# Patient Record
Sex: MALE | Race: ASIAN | NOT HISPANIC OR LATINO | Employment: FULL TIME | URBAN - METROPOLITAN AREA
[De-identification: names, ages, dates, MRNs, and addresses within clinical notes are randomized per-mention and may not be internally consistent; named-entity substitution may affect disease eponyms.]

---

## 2017-01-31 ENCOUNTER — OFFICE VISIT (OUTPATIENT)
Dept: URGENT CARE | Facility: CLINIC | Age: 72
End: 2017-01-31
Payer: COMMERCIAL

## 2017-01-31 VITALS
BODY MASS INDEX: 23.32 KG/M2 | RESPIRATION RATE: 14 BRPM | OXYGEN SATURATION: 98 % | HEIGHT: 65 IN | HEART RATE: 80 BPM | SYSTOLIC BLOOD PRESSURE: 124 MMHG | DIASTOLIC BLOOD PRESSURE: 74 MMHG | WEIGHT: 140 LBS | TEMPERATURE: 99.5 F

## 2017-01-31 DIAGNOSIS — M17.12 PRIMARY OSTEOARTHRITIS OF LEFT KNEE: ICD-10-CM

## 2017-01-31 DIAGNOSIS — M25.562 CHRONIC PAIN OF LEFT KNEE: Primary | ICD-10-CM

## 2017-01-31 DIAGNOSIS — G89.29 CHRONIC PAIN OF LEFT KNEE: Primary | ICD-10-CM

## 2017-01-31 PROCEDURE — 99204 OFFICE O/P NEW MOD 45 MIN: CPT | Performed by: PHYSICIAN ASSISTANT

## 2017-01-31 RX ORDER — TRAMADOL HYDROCHLORIDE 50 MG/1
50 TABLET ORAL EVERY 4 HOURS PRN
Qty: 30 TAB | Refills: 0 | Status: SHIPPED | OUTPATIENT
Start: 2017-01-31 | End: 2017-10-18

## 2017-01-31 NOTE — MR AVS SNAPSHOT
"        Parker Kern   2017 5:00 PM   Office Visit   MRN: [id number]    Department:  [facility]   Dept Phone:  [phone]    Description:  Male : 1945   Provider:  Nathan Zuniga PA-C           Reason for Visit     Knee Pain l knee pain pain x 1 month . was seen at the [facility] , was treated with a steroid injection . says it did not work . still very painful .      Allergies as of 2017     No Known Allergies      You were diagnosed with     Chronic pain of left knee   [169623]  -  Primary     Primary osteoarthritis of left knee   [397883]         Vital Signs     Blood Pressure Pulse Temperature Respirations Height Weight    124/74 mmHg 80 37.5 °C (99.5 °F) 14 1.651 m (5' 5\") 63.504 kg (140 lb)    Body Mass Index Oxygen Saturation Smoking Status             23.30 kg/m2 98% Former Smoker         Basic Information     Date Of Birth Sex Race Ethnicity Preferred Language    1945 Male  Non- English      Your appointments     2017 12:00 PM   Access New To You with JUJU Kapoor   [address]  [facility] [id number] [phone]              Problem List              ICD-10-CM Priority Class Noted - Resolved    Glucose intolerance (impaired glucose tolerance) R73.02   Unknown - Present    HTN (hypertension), benign I10   2010 - Present    BPH (benign prostatic hyperplasia) N40.0   2010 - Present    Gout, arthritis M10.9   2011 - Present    AR (allergic rhinitis) J30.9   2011 - Present    Cataract, right eye H26.9   2014 - Present    Renal insufficiency N28.9   2016 - Present      Health Maintenance        Date Due Completion Dates    IMM DTaP/Tdap/Td Vaccine (1 - Tdap) 1962 ---    IMM ZOSTER VACCINE 2003 ---    COLON CANCER SCREENING ANNUAL FIT 10/15/2014 10/15/2013, 10/23/2012    IMM INFLUENZA (1) 2016 10/1/2015, 10/15/2012, 10/10/2011, 10/1/2010            Current " Immunizations     13-VALENT PCV PREVNAR 1/13/2016    Influenza TIV (IM) 10/1/2015, 10/15/2012, 10/10/2011, 10/1/2010    Pneumococcal polysaccharide vaccine (PPSV-23) 10/16/2012      Below and/or attached are the medications your provider expects you to take. Review all of your home medications and newly ordered medications with your provider and/or pharmacist. Follow medication instructions as directed by your provider and/or pharmacist. Please keep your medication list with you and share with your provider. Update the information when medications are discontinued, doses are changed, or new medications (including over-the-counter products) are added; and carry medication information at all times in the event of emergency situations     Allergies:  No Known Allergies          Medications  Valid as of: January 31, 2017 -  5:41 PM    Generic Name Brand Name Tablet Size Instructions for use    Allopurinol (Tab) ZYLOPRIM 100 MG TAKE ONE TABLET BY MOUTH ONCE A DAY        Aspirin (Tab) aspirin 81 MG Take 81 mg by mouth every day.        Fluticasone Propionate (Suspension) FLONASE 50 MCG/ACT USE ONE SPRAY IN EACH NOSTRIL ONCE A DAY        Losartan Potassium (Tab) COZAAR 50 MG TAKE ONE TABLET BY MOUTH ONCE A DAY        Terazosin HCl (Cap) HYTRIN 5 MG TAKE ONE CAPSULE BY MOUTH ONCE A DAY        TraMADol HCl (Tab) ULTRAM 50 MG Take 1 Tab by mouth every four hours as needed.        Triamcinolone Acetonide (Cream) KENALOG 0.1 % Apply 1 Application to affected area(s) 2 times a day.        .                 Medicines prescribed today were sent to:     [id number] - [place], [place] - [address] [address]    Phone: [phone] Fax: [phone]    Open 24 Hours?: No      Medication refill instructions:       If your prescription bottle indicates you have medication refills left, it is not necessary to call your provider’s office. Please contact your pharmacy and they will refill your  medication.    If your prescription bottle indicates you do not have any refills left, you may request refills at any time through one of the following ways: The online [facility] system (except Urgent Care), by calling your provider’s office, or by asking your pharmacy to contact your provider’s office with a refill request. Medication refills are processed only during regular business hours and may not be available until the next business day. Your provider may request additional information or to have a follow-up visit with you prior to refilling your medication.   *Please Note: Medication refills are assigned a new Rx number when refilled electronically. Your pharmacy may indicate that no refills were authorized even though a new prescription for the same medication is available at the pharmacy. Please request the medicine by name with the pharmacy before contacting your provider for a refill.        Instructions    Degenerative Arthritis  You have osteoarthritis. This is the wear and tear arthritis that comes with aging. It is also called degenerative arthritis. This is common in people past middle age. It is caused by stress on the joints. The large weight bearing joints of the lower extremities are most often affected. The knees, hips, back, neck, and hands can become painful, swollen, and stiff. This is the most common type of arthritis. It comes on with age, carrying too much weight, or from an injury.  Treatment includes resting the sore joint until the pain and swelling improve. Crutches or a walker may be needed for severe flares. Only take over-the-counter or prescription medicines for pain, discomfort, or fever as directed by your caregiver. Local heat therapy may improve motion. Cortisone shots into the joint are sometimes used to reduce pain and swelling during flares.  Osteoarthritis is usually not crippling and progresses slowly. There are things you can do to decrease pain:  · Avoid high impact  activities.   · Exercise regularly.   · Low impact exercises such as walking, biking and swimming help to keep the muscles strong and keep normal joint function.   · Stretching helps to keep your range of motion.   · Lose weight if you are overweight. This reduces joint stress.   In severe cases when you have pain at rest or increasing disability, joint surgery may be helpful. See your caregiver for follow-up treatment as recommended.   SEEK IMMEDIATE MEDICAL CARE IF:   · You have severe joint pain.   · Marked swelling and redness in your joint develops.   · You develop a high fever.   Document Released: 12/18/2006 Document Revised: 03/11/2013 Document Reviewed: 05/19/2008  [employer]® Patient Information ©2013 [employer].          MyChart Status: Patient Declined

## 2017-02-01 NOTE — PROGRESS NOTES
Subjective:      Pt is a 71 y.o. male who presents with Knee Pain            Knee Pain  This is a chronic problem. The current episode started more than 1 year ago. The problem occurs constantly. The problem has been gradually worsening. The symptoms are aggravated by exertion, standing, walking and twisting. He has tried rest, relaxation, NSAIDs and ice ([facility] cortisone injections x 2) for the symptoms. The treatment provided no relief.   Pt states he has chronic left knee DJD and was seen at the [facility] and given a sequence of 2 cortisone shots and pain cream with no relief. 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 Pt has not taken any Rx medications for this condition. Pt states the pain is a 7/10, aching in nature and worse at night. Pt denies CP, SOB, NVD, paresthesias, headaches, dizziness, change in vision, hives, or other joint pain. The pt's medication list, problem list, and allergies have been evaluated and reviewed during today's visit.    PMH:  Past Medical History   Diagnosis Date   • HTN    • BPH    • Glucose intolerance (impaired glucose tolerance)    • COPD    • Chronic allergic rhinitis        PSH:  Past Surgical History   Procedure Laterality Date   • Cataract phaco with iol  9/9/2014     Performed by Nithin Morrow M.D. at [facility]       Fam Hx:    family history includes Genetic in his paternal grandfather; Hypertension in his mother.         Soc HX:  Social History     Social History   • Marital Status:      Spouse Name: N/A   • Number of Children: N/A   • Years of Education: N/A     Occupational History   • Not on file.     Social History Main Topics   • Smoking status: Former Smoker   • Smokeless tobacco: Never Used   • Alcohol Use: No      Comment: rarely   • Drug Use: No   • Sexual Activity: Not on file     Other Topics Concern   • Not on file     Social History Narrative         Medications:    Current outpatient prescriptions:   •   "tramadol (ULTRAM) 50 MG Tab, Take 1 Tab by mouth every four hours as needed., Disp: 30 Tab, Rfl: 0  •  terazosin (HYTRIN) 5 MG Cap, TAKE ONE CAPSULE BY MOUTH ONCE A DAY, Disp: 90 Cap, Rfl: 2  •  losartan (COZAAR) 50 MG Tab, TAKE ONE TABLET BY MOUTH ONCE A DAY, Disp: 30 Tab, Rfl: 11  •  allopurinol (ZYLOPRIM) 100 MG Tab, TAKE ONE TABLET BY MOUTH ONCE A DAY, Disp: 90 Tab, Rfl: 1  •  fluticasone (FLONASE) 50 MCG/ACT nasal spray, USE ONE SPRAY IN EACH NOSTRIL ONCE A DAY, Disp: 16 g, Rfl: 6  •  ASPIRIN 81 MG PO TABS, Take 81 mg by mouth every day., Disp: , Rfl:   •  triamcinolone acetonide (KENALOG) 0.1 % CREA, Apply 1 Application to affected area(s) 2 times a day., Disp: 1 Tube, Rfl: 2      Allergies:  Review of patient's allergies indicates no known allergies.         ROS  Constitutional: Negative for fever, chills and malaise/fatigue.   HENT: Negative for congestion and sore throat.    Eyes: Negative for blurred vision, double vision and photophobia.   Respiratory: Negative for cough and shortness of breath.    Cardiovascular: Negative for chest pain and palpitations.   Gastrointestinal: Negative for heartburn, nausea, vomiting, abdominal pain, diarrhea and constipation.   Genitourinary: Negative for dysuria and flank pain.   Musculoskeletal: POS for left knee joint pain and myalgias.   Skin: Negative for itching and rash.   Neurological: Negative for dizziness, tingling and headaches.   Endo/Heme/Allergies: Does not bruise/bleed easily.   Psychiatric/Behavioral: Negative for depression. The patient is not nervous/anxious.           Objective:     /74 mmHg  Pulse 80  Temp(Src) 37.5 °C (99.5 °F)  Resp 14  Ht 1.651 m (5' 5\")  Wt 63.504 kg (140 lb)  BMI 23.30 kg/m2  SpO2 98%     Physical Exam   Musculoskeletal:        Left knee: He exhibits decreased range of motion, swelling and bony tenderness. He exhibits no effusion, no ecchymosis, no deformity, no laceration, no erythema, normal alignment, no LCL " laxity, normal patellar mobility, normal meniscus and no MCL laxity. Tenderness found. Medial joint line and lateral joint line tenderness noted. No MCL, no LCL and no patellar tendon tenderness noted.        Legs:        Constitutional: PT is oriented to person, place, and time. PT appears well-developed and well-nourished. No distress.   HENT:   Head: Normocephalic and atraumatic.   Mouth/Throat: Oropharynx is clear and moist. No oropharyngeal exudate.   Eyes: Conjunctivae normal and EOM are normal. Pupils are equal, round, and reactive to light.   Neck: Normal range of motion. Neck supple. No thyromegaly present.   Cardiovascular: Normal rate, regular rhythm, normal heart sounds and intact distal pulses.  Exam reveals no gallop and no friction rub.    No murmur heard.  Pulmonary/Chest: Effort normal and breath sounds normal. No respiratory distress. PT has no wheezes. PT has no rales. Pt exhibits no tenderness.   Abdominal: Soft. Bowel sounds are normal. PT exhibits no distension and no mass. There is no tenderness. There is no rebound and no guarding.   Neurological: PT is alert and oriented to person, place, and time. PT has normal reflexes. No cranial nerve deficit.   Skin: Skin is warm and dry. No rash noted. PT is not diaphoretic. No erythema.       Psychiatric: PT has a normal mood and affect. PT behavior is normal. Judgment and thought content normal.          Assessment/Plan:     1. Chronic pain of left knee    - tramadol (ULTRAM) 50 MG Tab; Take 1 Tab by mouth every four hours as needed.  Dispense: 30 Tab; Refill: 0    2. Primary osteoarthritis of left knee    - tramadol (ULTRAM) 50 MG Tab; Take 1 Tab by mouth every four hours as needed.  Dispense: 30 Tab; Refill: 0    [place]  Aware web site evaluation: I have obtained and reviewed patient utilization report from [facility] pharmacy database prior to writing prescription for controlled substance II, III or IV per [place] bill . Based on the  report and my clinical assessment the prescription is medically necessary.   NSAIDs for pain 1-5, Ultram for pain 6-10 or to help get to sleep.  RICE therapy discussed  Gentle ROM exercises discussed  WBAT left LE  Ice/heat therapy discussed  Pt to follow up with his ortho doc for discussion about LEFT TKR  Rest, fluids encouraged.  AVS with medical info given.  Pt was in full understanding and agreement with the plan.  Follow-up as needed if symptoms worsen or fail to improve.

## 2017-02-01 NOTE — PATIENT INSTRUCTIONS
Degenerative Arthritis  You have osteoarthritis. This is the wear and tear arthritis that comes with aging. It is also called degenerative arthritis. This is common in people past middle age. It is caused by stress on the joints. The large weight bearing joints of the lower extremities are most often affected. The knees, hips, back, neck, and hands can become painful, swollen, and stiff. This is the most common type of arthritis. It comes on with age, carrying too much weight, or from an injury.  Treatment includes resting the sore joint until the pain and swelling improve. Crutches or a walker may be needed for severe flares. Only take over-the-counter or prescription medicines for pain, discomfort, or fever as directed by your caregiver. Local heat therapy may improve motion. Cortisone shots into the joint are sometimes used to reduce pain and swelling during flares.  Osteoarthritis is usually not crippling and progresses slowly. There are things you can do to decrease pain:  · Avoid high impact activities.   · Exercise regularly.   · Low impact exercises such as walking, biking and swimming help to keep the muscles strong and keep normal joint function.   · Stretching helps to keep your range of motion.   · Lose weight if you are overweight. This reduces joint stress.   In severe cases when you have pain at rest or increasing disability, joint surgery may be helpful. See your caregiver for follow-up treatment as recommended.   SEEK IMMEDIATE MEDICAL CARE IF:   · You have severe joint pain.   · Marked swelling and redness in your joint develops.   · You develop a high fever.   Document Released: 12/18/2006 Document Revised: 03/11/2013 Document Reviewed: 05/19/2008  [employer]® Patient Information ©2013 [employer].

## 2017-03-09 RX ORDER — ALLOPURINOL 100 MG/1
TABLET ORAL
Qty: 90 TAB | Refills: 3 | Status: SHIPPED | OUTPATIENT
Start: 2017-03-09 | End: 2017-10-18 | Stop reason: SDUPTHER

## 2017-05-31 ENCOUNTER — OFFICE VISIT (OUTPATIENT)
Dept: PULMONOLOGY | Facility: HOSPICE | Age: 72
End: 2017-05-31
Payer: COMMERCIAL

## 2017-05-31 VITALS
OXYGEN SATURATION: 93 % | HEIGHT: 65 IN | WEIGHT: 140 LBS | TEMPERATURE: 97.7 F | RESPIRATION RATE: 16 BRPM | HEART RATE: 101 BPM | DIASTOLIC BLOOD PRESSURE: 74 MMHG | BODY MASS INDEX: 23.32 KG/M2 | SYSTOLIC BLOOD PRESSURE: 122 MMHG

## 2017-05-31 DIAGNOSIS — R05.9 COUGH: ICD-10-CM

## 2017-05-31 DIAGNOSIS — J32.9 CHRONIC SINUSITIS, UNSPECIFIED LOCATION: ICD-10-CM

## 2017-05-31 PROCEDURE — 99244 OFF/OP CNSLTJ NEW/EST MOD 40: CPT | Performed by: INTERNAL MEDICINE

## 2017-05-31 NOTE — MR AVS SNAPSHOT
"Parker Woojonelle   2017 9:00 AM   Office Visit   MRN: [id number]    Department:  [facility]   Dept Phone:  [phone]    Description:  Male : 1945   Provider:  Khanh Sharma M.D.           Reason for Visit     Establish Care     Cough           Allergies as of 2017     No Known Allergies      You were diagnosed with     Cough   [786.2.ICD-9-CM]       Chronic sinusitis, unspecified location   [0157689]         Vital Signs     Blood Pressure Pulse Temperature Respirations Height Weight    122/74 mmHg 101 36.5 °C (97.7 °F) 16 1.651 m (5' 5\") 63.504 kg (140 lb)    Body Mass Index Oxygen Saturation Smoking Status             23.30 kg/m2 93% Former Smoker         Basic Information     Date Of Birth Sex Race Ethnicity Preferred Language    1945 Male  Non- English      Problem List              ICD-10-CM Priority Class Noted - Resolved    Glucose intolerance (impaired glucose tolerance) R73.02   Unknown - Present    HTN (hypertension), benign I10   2010 - Present    BPH (benign prostatic hyperplasia) N40.0   2010 - Present    Gout, arthritis M10.9   2011 - Present    AR (allergic rhinitis) J30.9   2011 - Present    Cataract, right eye H26.9   2014 - Present    Renal insufficiency N28.9   2016 - Present      Health Maintenance        Date Due Completion Dates    IMM DTaP/Tdap/Td Vaccine (1 - Tdap) 1962 ---    IMM ZOSTER VACCINE 2003 ---    COLON CANCER SCREENING ANNUAL FIT 10/15/2014 10/15/2013, 10/23/2012            Current Immunizations     13-VALENT PCV PREVNAR 2016    Influenza TIV (IM) 10/1/2015, 10/15/2012, 10/10/2011, 10/1/2010    Pneumococcal polysaccharide vaccine (PPSV-23) 10/16/2012      Below and/or attached are the medications your provider expects you to take. Review all of your home medications and newly ordered medications with your provider and/or pharmacist. Follow medication instructions as directed by your " provider and/or pharmacist. Please keep your medication list with you and share with your provider. Update the information when medications are discontinued, doses are changed, or new medications (including over-the-counter products) are added; and carry medication information at all times in the event of emergency situations     Allergies:  No Known Allergies          Medications  Valid as of: May 31, 2017 -  9:27 AM    Generic Name Brand Name Tablet Size Instructions for use    Allopurinol (Tab) ZYLOPRIM 100 MG TAKE ONE TABLET BY MOUTH ONCE A DAY        Aspirin (Tab) aspirin 81 MG Take 81 mg by mouth every day.        Fluticasone Propionate (Suspension) FLONASE 50 MCG/ACT USE ONE SPRAY IN EACH NOSTRIL ONCE A DAY        Losartan Potassium (Tab) COZAAR 50 MG TAKE ONE TABLET BY MOUTH ONCE A DAY        Terazosin HCl (Cap) HYTRIN 5 MG TAKE ONE CAPSULE BY MOUTH ONCE A DAY        TraMADol HCl (Tab) ULTRAM 50 MG Take 1 Tab by mouth every four hours as needed.        Triamcinolone Acetonide (Cream) KENALOG 0.1 % Apply 1 Application to affected area(s) 2 times a day.        .                 Medicines prescribed today were sent to:     [facility] - [place], [place] - [address] [place] [address]    Phone: [phone] Fax: [phone]    Open 24 Hours?: No      Medication refill instructions:       If your prescription bottle indicates you have medication refills left, it is not necessary to call your provider’s office. Please contact your pharmacy and they will refill your medication.    If your prescription bottle indicates you do not have any refills left, you may request refills at any time through one of the following ways: The online [employer] system (except Urgent Care), by calling your provider’s office, or by asking your pharmacy to contact your provider’s office with a refill request. Medication refills are processed only during regular business hours and may not be available until  the next business day. Your provider may request additional information or to have a follow-up visit with you prior to refilling your medication.   *Please Note: Medication refills are assigned a new Rx number when refilled electronically. Your pharmacy may indicate that no refills were authorized even though a new prescription for the same medication is available at the pharmacy. Please request the medicine by name with the pharmacy before contacting your provider for a refill.        Your To Do List     Future Labs/Procedures Complete By Expires    AFB CULTURE  As directed 5/31/2018    AMB PULMONARY FUNCTION TEST/LAB  As directed 5/31/2018    Comments:    Please do pre and post, diffusion, lung volumes    CT-CHEST (THORAX) W/O  As directed 5/31/2018    CT-MAXILLOFACIAL W/O PLUS RECONS  As directed 5/31/2018    CULTURE RESPIRATORY W/ GRM STN  As directed 5/31/2018      Referral     A referral request has been sent to our patient care coordination department. Please allow 3-5 business days for us to process this request and contact you either by phone or mail. If you do not hear from us by the 5th business day, please call us at [phone].        Instructions    1. We have scheduled a CT scan of the sinuses  2. We have scheduled a CT scan of the chest  3. We have requested sputum cultures to include AFB culture  4. We have referral to ENT for her sinusitis  5. We have ordered pulmonary function test  6. Recommend continuing with Flonase and Mucinex. Also recommend continuing with saline nasal rinses  7. Recommended follow-up after the above tests have been completed          DesignPax Access Code: [email or web address]  Expires: 6/15/2017 10:04 AM    DesignPax  A secure, online tool to manage your health information     [employer] DesignPax® is a secure, online tool that connects you to your personalized health information from the privacy of your home -- day or night - making it very easy for you to manage your  healthcare. Once the activation process is completed, you can even access your medical information using the 360Guanxi hardy, which is available for free in the Apple Hardy store or Google Play store.     360Guanxi provides the following levels of access (as shown below):   My Chart Features   [facility] Primary [facility] Doctor [facility]  Specialists [facility]  Urgent  Care Non-[facility]  Primary Care  Doctor   Email your healthcare team securely and privately 24/7 X X X    Manage appointments: schedule your next appointment; view details of past/upcoming appointments X      Request prescription refills. X      View recent personal medical records, including lab and immunizations X X X X   View health record, including health history, allergies, medications X X X X   Read reports about your outpatient visits, procedures, consult and ER notes X X X X   See your discharge summary, which is a recap of your hospital and/or ER visit that includes your diagnosis, lab results, and care plan. X X       How to register for 360Guanxi:  1. Go to  [email or web address].  2. Click on the Sign Up Now box, which takes you to the New Member Sign Up page. You will need to provide the following information:  a. Enter your 360Guanxi Access Code exactly as it appears at the top of this page. (You will not need to use this code after you’ve completed the sign-up process. If you do not sign up before the expiration date, you must request a new code.)   b. Enter your date of birth.   c. Enter your home email address.   d. Click Submit, and follow the next screen’s instructions.  3. Create a 360Guanxi ID. This will be your 360Guanxi login ID and cannot be changed, so think of one that is secure and easy to remember.  4. Create a 360Guanxi password. You can change your password at any time.  5. Enter your Password Reset Question and Answer. This can be used at a later time if you forget your password.   6. Enter your e-mail address. This allows you to receive e-mail  notifications when new information is available in [employer].  7. Click Sign Up. You can now view your health information.    For assistance activating your [employer] account, call [phone]

## 2017-05-31 NOTE — PROGRESS NOTES
Parker Kern is a 71 y.o. male here for chronic cough.  Patient was referred by Dr. Jose Maher.    History of Present Illness:    The patient is a 71-year-old with a history of gout and hypertension. He is on losartan. He is having a chronic cough for over a year however I do notice that he was seen in this office in 2008 for cough at that time. He does have a history of sinus disease. He takes Flonase and Mucinex. He used to do saline nasal rinses but he stopped doing this. He takes Afrin periodically. He quit smoking 20 years ago. He has no history of asthma or COPD. He does have a history of some type of TB exposure. He is from the [facility]. He says that he received INH in the [facility] for about 3 months. He also mentions something about a lymph node biopsy but his history is unclear. 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 He denies any fevers or chills or night sweats. He has had some mild weight loss. His last chest x-ray was in January 2016 and it was clear. He's not had prior pulmonary function testing. He denies any chest pains or pleurisy. He denies any dyspnea on exertion. He has had no wheezing.    Constitutional:  Negative for fever, chills, sweats, and fatigue.  Eyes:  Negative for eye pain and visual changes.  HENT:  Negative for tinnitus and hoarse voice.  Cardiovascular:  Negative for chest pain, leg swelling, syncope and orthopnea.  Respiratory:  See HPI for pertinent negatives  Sleep:  Negative for somnolence, loud snoring, sleep disturbance due to breathing, insomnia.  Gastrointestinal:  Negative for dysphagia, nausea and abdominal pain.  Heme/lymph:  Denies easy bruising, blood clots.  Musculoskeletal:  Negative for arthralgias, sore muscles and back pain.  Skin:  Negative for rash and color change.  Neurological:  Negative for headaches, lightheadedness and weakness.  Psychiatric:  Denies depression.    Current Outpatient Prescriptions   Medication Sig Dispense Refill  "  • fluticasone (FLONASE) 50 MCG/ACT nasal spray USE ONE SPRAY IN EACH NOSTRIL ONCE A DAY 16 g 2   • allopurinol (ZYLOPRIM) 100 MG Tab TAKE ONE TABLET BY MOUTH ONCE A DAY 90 Tab 3   • tramadol (ULTRAM) 50 MG Tab Take 1 Tab by mouth every four hours as needed. 30 Tab 0   • terazosin (HYTRIN) 5 MG Cap TAKE ONE CAPSULE BY MOUTH ONCE A DAY 90 Cap 2   • losartan (COZAAR) 50 MG Tab TAKE ONE TABLET BY MOUTH ONCE A DAY 30 Tab 11   • triamcinolone acetonide (KENALOG) 0.1 % CREA Apply 1 Application to affected area(s) 2 times a day. 1 Tube 2   • ASPIRIN 81 MG PO TABS Take 81 mg by mouth every day.       No current facility-administered medications for this visit.       Social History   Substance Use Topics   • Smoking status: Former Smoker -- 0.25 packs/day for 3 years     Types: Cigarettes     Quit date: 01/01/1995   • Smokeless tobacco: Never Used   • Alcohol Use: No      Comment: rarely       Past Medical History   Diagnosis Date   • HTN    • BPH    • Glucose intolerance (impaired glucose tolerance)    • COPD    • Chronic allergic rhinitis    • Allergic rhinitis        Past Surgical History   Procedure Laterality Date   • Cataract phaco with iol  9/9/2014     Performed by Nithin Morrow M.D. at [facility]       Allergies:  Review of patient's allergies indicates no known allergies.    Family History   Problem Relation Age of Onset   • Genetic Paternal Grandfather    • Hypertension Mother    • No Known Problems Father        Physical Examination    Filed Vitals:    05/31/17 0847   Height: 1.651 m (5' 5\")   Weight: 63.504 kg (140 lb)   Weight % change since last entry.: 0 %   BP: 122/74   Pulse: 101   BMI (Calculated): 23.3   Resp: 16   Temp: 36.5 °C (97.7 °F)   O2 sat % room air: 93 %       Physical Exam:  Constitutional:  Well developed and well nourished.  Head:  Normocephalic and atraumatic.  Nose:  Nose normal.  Mouth/Throat:  Oropharynx is clear and moist, no lesions.    Eyes:  Conjunctivae and EOM are " normal.  Pupils are equal, round, and reactive to light.  Neck:  Normal range of motion.  Supple.  No JVD. No tracheal deviation.  No thyromegally  Cardiovascular:  Normal rate, regular rhythm, normal heart sounds and intact distal pulses.  Pulmonary/Chest:  No accessory muscle use.  No wheezing, rales or rhonchi.  No dullness to percussion, tenderness or deformity.  Abdominal:  Soft.  No ascites.  No Hepatosplenomegally.  Non tender.  Musculoskeletal.  Normal range of motion.  No muscular atrophy.  Lymphadenopathy:  No cervical or supraclavicular adenopathy  Neurological:  Alert and oriented.  Cranial nerves intact.  No focal deficits  Skin:  No rashes or ulcers.  Psyciatric:  Normal mood and affect.      Assessment and Plan:  1. Cough  I suspect the cough is related to postnasal drip and sinusitis. Must consider the possibility of COPD and asthma. Must also consider the possibility of TB. We will check pulmonary function testing as well as a CT scan of the chest. I have also ordered AFB sputum culture as well as a regular sputum culture.  - AMB PULMONARY FUNCTION TEST/LAB; Future  - AFB CULTURE; Future  - CULTURE RESPIRATORY W/ [facility] STN; Future  - CT-CHEST (THORAX) W/O; Future    2. Chronic sinusitis, unspecified location  I ordered a CT scan of the sinuses and I made a referral over to ENT. I would like him to continue with the Flonase, Mucinex and saline nasal rinses.  - CT-MAXILLOFACIAL W/O PLUS RECONS; Future  - REFERRAL TO ENT          Followup Return in about 6 weeks (around 7/12/2017) for follow up visit with Khanh Sharma MD.

## 2017-05-31 NOTE — PATIENT INSTRUCTIONS
1. We have scheduled a CT scan of the sinuses  2. We have scheduled a CT scan of the chest  3. We have requested sputum cultures to include AFB culture  4. We have referral to ENT for her sinusitis  5. We have ordered pulmonary function test  6. Recommend continuing with Flonase and Mucinex. Also recommend continuing with saline nasal rinses  7. Recommended follow-up after the above tests have been completed

## 2017-08-16 RX ORDER — TERAZOSIN 5 MG/1
CAPSULE ORAL
Qty: 30 CAP | Refills: 0 | Status: SHIPPED | OUTPATIENT
Start: 2017-08-16 | End: 2017-08-17 | Stop reason: SDUPTHER

## 2017-08-17 RX ORDER — TERAZOSIN 5 MG/1
CAPSULE ORAL
Qty: 30 CAP | Refills: 0 | Status: SHIPPED | OUTPATIENT
Start: 2017-08-17 | End: 2017-09-18 | Stop reason: SDUPTHER

## 2017-08-31 RX ORDER — FLUTICASONE PROPIONATE 50 MCG
SPRAY, SUSPENSION (ML) NASAL
Qty: 1 BOTTLE | Refills: 0 | Status: SHIPPED | OUTPATIENT
Start: 2017-08-31 | End: 2017-09-02 | Stop reason: SDUPTHER

## 2017-08-31 NOTE — TELEPHONE ENCOUNTER
Was the patient seen in the last year in this department?  No    Does patient have an active prescription for medications requested? No     Received Request Via: Pharmacy     Patient has not been seen since 5/2016? Please advise

## 2017-09-18 RX ORDER — TERAZOSIN 5 MG/1
CAPSULE ORAL
Qty: 30 CAP | Refills: 0 | Status: SHIPPED | OUTPATIENT
Start: 2017-09-18 | End: 2017-10-18 | Stop reason: SDUPTHER

## 2017-10-18 ENCOUNTER — OFFICE VISIT (OUTPATIENT)
Dept: MEDICAL GROUP | Facility: MEDICAL CENTER | Age: 72
End: 2017-10-18
Payer: COMMERCIAL

## 2017-10-18 VITALS
HEART RATE: 78 BPM | DIASTOLIC BLOOD PRESSURE: 60 MMHG | OXYGEN SATURATION: 95 % | WEIGHT: 131 LBS | BODY MASS INDEX: 21.8 KG/M2 | TEMPERATURE: 99 F | SYSTOLIC BLOOD PRESSURE: 118 MMHG | RESPIRATION RATE: 16 BRPM

## 2017-10-18 DIAGNOSIS — R73.02 GLUCOSE INTOLERANCE (IMPAIRED GLUCOSE TOLERANCE): ICD-10-CM

## 2017-10-18 DIAGNOSIS — Z23 INFLUENZA VACCINE NEEDED: ICD-10-CM

## 2017-10-18 DIAGNOSIS — N40.1 BENIGN PROSTATIC HYPERPLASIA WITH URINARY RETENTION: ICD-10-CM

## 2017-10-18 DIAGNOSIS — R33.8 BENIGN PROSTATIC HYPERPLASIA WITH URINARY RETENTION: ICD-10-CM

## 2017-10-18 DIAGNOSIS — Z12.11 SCREEN FOR COLON CANCER: ICD-10-CM

## 2017-10-18 DIAGNOSIS — I10 HTN (HYPERTENSION), BENIGN: ICD-10-CM

## 2017-10-18 DIAGNOSIS — M10.9 GOUT, ARTHRITIS: ICD-10-CM

## 2017-10-18 PROCEDURE — 90662 IIV NO PRSV INCREASED AG IM: CPT | Performed by: INTERNAL MEDICINE

## 2017-10-18 PROCEDURE — 99214 OFFICE O/P EST MOD 30 MIN: CPT | Mod: 25 | Performed by: INTERNAL MEDICINE

## 2017-10-18 PROCEDURE — 90471 IMMUNIZATION ADMIN: CPT | Performed by: INTERNAL MEDICINE

## 2017-10-18 RX ORDER — TERAZOSIN 5 MG/1
5 CAPSULE ORAL DAILY
Qty: 30 CAP | Refills: 11 | Status: SHIPPED | OUTPATIENT
Start: 2017-10-18 | End: 2017-10-18

## 2017-10-18 RX ORDER — ALLOPURINOL 100 MG/1
100 TABLET ORAL DAILY
Qty: 90 TAB | Refills: 3 | Status: SHIPPED | OUTPATIENT
Start: 2017-10-18 | End: 2018-11-02 | Stop reason: SDUPTHER

## 2017-10-18 RX ORDER — TERAZOSIN 5 MG/1
5 CAPSULE ORAL DAILY
Qty: 90 CAP | Refills: 3 | Status: SHIPPED | OUTPATIENT
Start: 2017-10-18 | End: 2018-11-05 | Stop reason: SDUPTHER

## 2017-10-18 RX ORDER — LOSARTAN POTASSIUM 50 MG/1
50 TABLET ORAL DAILY
Qty: 90 TAB | Refills: 3 | Status: SHIPPED | OUTPATIENT
Start: 2017-10-18 | End: 2017-12-27 | Stop reason: SDUPTHER

## 2017-10-18 ASSESSMENT — PATIENT HEALTH QUESTIONNAIRE - PHQ9: CLINICAL INTERPRETATION OF PHQ2 SCORE: 0

## 2017-10-18 NOTE — PROGRESS NOTES
CC: Multiple issues    HPI:   Parker presents today with the following.    1. Glucose intolerance (impaired glucose tolerance)  Not been seen in one year he does check sugars occasionally at home reporting good results. No excessive thirst or urination.    2. Benign prostatic hyperplasia with urinary retention  Maintain on Vogel and reports slightly weakened stream but no severe symptoms at night.    3. HTN (hypertension), benign  Blood pressure well controlled denying any chest pain or shortness of breath no edema.    4. Gout, arthritis  No recent gout flares maintain on medications.    5. Screen for colon cancer  Due for stool test.    6. Influenza vaccine needed        Patient Active Problem List    Diagnosis Date Noted   • Renal insufficiency 06/09/2016   • Cataract, right eye 09/09/2014   • AR (allergic rhinitis) 09/13/2011   • Gout, arthritis 01/26/2011   • Benign prostatic hyperplasia with urinary retention 09/22/2010   • HTN (hypertension), benign 06/09/2010   • Glucose intolerance (impaired glucose tolerance)        Current Outpatient Prescriptions   Medication Sig Dispense Refill   • terazosin (HYTRIN) 5 MG Cap Take 1 Cap by mouth every day. 90 Cap 3   • allopurinol (ZYLOPRIM) 100 MG Tab Take 1 Tab by mouth every day. 90 Tab 3   • losartan (COZAAR) 50 MG Tab Take 1 Tab by mouth every day. 90 Tab 3   • fluticasone (FLONASE) 50 MCG/ACT nasal spray USE ONE SPRAY IN EACH NOSTRIL ONCE A DAY 16 g 1   • ASPIRIN 81 MG PO TABS Take 81 mg by mouth every day.       No current facility-administered medications for this visit.          Allergies as of 10/18/2017   • (No Known Allergies)        ROS: As per HPI.    /60   Pulse 78   Temp 37.2 °C (99 °F)   Resp 16   Wt 59.4 kg (131 lb)   SpO2 95%   BMI 21.80 kg/m²     Physical Exam:  Gen:         Alert and oriented, No apparent distress.  Neck:        No Lymphadenopathy or Bruits.  Lungs:     Clear to auscultation bilaterally  CV:          Regular rate and  rhythm. No murmurs, rubs or gallops.               Ext:          No clubbing, cyanosis, edema.      Assessment and Plan.   72 y.o. male with the following issues.    1. Glucose intolerance (impaired glucose tolerance)  Discussed diet exercise rechecking labs.  - COMP METABOLIC PANEL; Future  - LIPID PROFILE; Future  - HEMOGLOBIN A1C; Future    2. Benign prostatic hyperplasia with urinary retention  Continue current medications.    3. HTN (hypertension), benign  Currently well controlled, Discuss diet, exercise and salt restriction.    4. Gout, arthritis  Continue allopurinol    5. Screen for colon cancer    - OCCULT BLOOD FECES IMMUNOASSAY; Future    6. Influenza vaccine needed    - INFLUENZA VACCINE, HIGH DOSE (65+ ONLY)

## 2017-10-19 ENCOUNTER — HOSPITAL ENCOUNTER (OUTPATIENT)
Dept: LAB | Facility: MEDICAL CENTER | Age: 72
End: 2017-10-19
Attending: INTERNAL MEDICINE
Payer: COMMERCIAL

## 2017-10-19 DIAGNOSIS — R73.02 GLUCOSE INTOLERANCE (IMPAIRED GLUCOSE TOLERANCE): ICD-10-CM

## 2017-10-19 LAB
ALBUMIN SERPL BCP-MCNC: 4 G/DL (ref 3.2–4.9)
ALBUMIN/GLOB SERPL: 1.4 G/DL
ALP SERPL-CCNC: 62 U/L (ref 30–99)
ALT SERPL-CCNC: 13 U/L (ref 2–50)
ANION GAP SERPL CALC-SCNC: 7 MMOL/L (ref 0–11.9)
AST SERPL-CCNC: 17 U/L (ref 12–45)
BILIRUB SERPL-MCNC: 0.7 MG/DL (ref 0.1–1.5)
BUN SERPL-MCNC: 19 MG/DL (ref 8–22)
CALCIUM SERPL-MCNC: 9.4 MG/DL (ref 8.5–10.5)
CHLORIDE SERPL-SCNC: 109 MMOL/L (ref 96–112)
CHOLEST SERPL-MCNC: 163 MG/DL (ref 100–199)
CO2 SERPL-SCNC: 24 MMOL/L (ref 20–33)
CREAT SERPL-MCNC: 1.3 MG/DL (ref 0.5–1.4)
EST. AVERAGE GLUCOSE BLD GHB EST-MCNC: 117 MG/DL
GFR SERPL CREATININE-BSD FRML MDRD: 54 ML/MIN/1.73 M 2
GLOBULIN SER CALC-MCNC: 2.8 G/DL (ref 1.9–3.5)
GLUCOSE SERPL-MCNC: 115 MG/DL (ref 65–99)
HBA1C MFR BLD: 5.7 % (ref 0–5.6)
HDLC SERPL-MCNC: 56 MG/DL
LDLC SERPL CALC-MCNC: 92 MG/DL
POTASSIUM SERPL-SCNC: 4.1 MMOL/L (ref 3.6–5.5)
PROT SERPL-MCNC: 6.8 G/DL (ref 6–8.2)
SODIUM SERPL-SCNC: 140 MMOL/L (ref 135–145)
TRIGL SERPL-MCNC: 73 MG/DL (ref 0–149)

## 2017-10-19 PROCEDURE — 36415 COLL VENOUS BLD VENIPUNCTURE: CPT

## 2017-10-19 PROCEDURE — 83036 HEMOGLOBIN GLYCOSYLATED A1C: CPT

## 2017-10-19 PROCEDURE — 80053 COMPREHEN METABOLIC PANEL: CPT

## 2017-10-19 PROCEDURE — 80061 LIPID PANEL: CPT

## 2017-10-20 ENCOUNTER — TELEPHONE (OUTPATIENT)
Dept: MEDICAL GROUP | Facility: MEDICAL CENTER | Age: 72
End: 2017-10-20

## 2017-10-20 ENCOUNTER — HOSPITAL ENCOUNTER (OUTPATIENT)
Facility: MEDICAL CENTER | Age: 72
End: 2017-10-20
Attending: INTERNAL MEDICINE
Payer: COMMERCIAL

## 2017-10-20 PROCEDURE — 82274 ASSAY TEST FOR BLOOD FECAL: CPT

## 2017-10-20 NOTE — TELEPHONE ENCOUNTER
----- Message from Jose Maher M.D. sent at 10/19/2017  6:55 PM PDT -----  Labs look ok recheck in 6 months.

## 2017-10-22 DIAGNOSIS — Z12.11 SCREEN FOR COLON CANCER: ICD-10-CM

## 2017-10-22 LAB — HEMOCCULT STL QL IA: NEGATIVE

## 2017-10-27 RX ORDER — FLUTICASONE PROPIONATE 50 MCG
SPRAY, SUSPENSION (ML) NASAL
Qty: 16 G | Refills: 0 | Status: SHIPPED | OUTPATIENT
Start: 2017-10-27 | End: 2017-11-28 | Stop reason: SDUPTHER

## 2017-11-28 RX ORDER — FLUTICASONE PROPIONATE 50 MCG
SPRAY, SUSPENSION (ML) NASAL
Qty: 16 G | Refills: 0 | Status: SHIPPED | OUTPATIENT
Start: 2017-11-28 | End: 2018-04-18 | Stop reason: SDUPTHER

## 2018-04-18 RX ORDER — FLUTICASONE PROPIONATE 50 MCG
1 SPRAY, SUSPENSION (ML) NASAL DAILY
Qty: 16 G | Refills: 2 | Status: SHIPPED | OUTPATIENT
Start: 2018-04-18 | End: 2018-08-30 | Stop reason: SDUPTHER

## 2018-08-30 RX ORDER — FLUTICASONE PROPIONATE 50 MCG
SPRAY, SUSPENSION (ML) NASAL
Qty: 16 G | Refills: 2 | Status: SHIPPED | OUTPATIENT
Start: 2018-08-30 | End: 2019-01-09 | Stop reason: SDUPTHER

## 2018-11-04 RX ORDER — ALLOPURINOL 100 MG/1
TABLET ORAL
Qty: 90 TAB | Refills: 2 | Status: SHIPPED | OUTPATIENT
Start: 2018-11-04 | End: 2019-11-20 | Stop reason: SDUPTHER

## 2018-11-06 RX ORDER — TERAZOSIN 5 MG/1
CAPSULE ORAL
Qty: 90 CAP | Refills: 2 | Status: SHIPPED | OUTPATIENT
Start: 2018-11-06 | End: 2019-08-08 | Stop reason: SDUPTHER

## 2019-01-09 ENCOUNTER — OFFICE VISIT (OUTPATIENT)
Dept: MEDICAL GROUP | Facility: MEDICAL CENTER | Age: 74
End: 2019-01-09
Payer: COMMERCIAL

## 2019-01-09 VITALS
OXYGEN SATURATION: 96 % | WEIGHT: 134 LBS | DIASTOLIC BLOOD PRESSURE: 82 MMHG | HEART RATE: 87 BPM | BODY MASS INDEX: 22.33 KG/M2 | HEIGHT: 65 IN | SYSTOLIC BLOOD PRESSURE: 136 MMHG | TEMPERATURE: 98.3 F

## 2019-01-09 DIAGNOSIS — R73.02 IGT (IMPAIRED GLUCOSE TOLERANCE): ICD-10-CM

## 2019-01-09 DIAGNOSIS — R33.8 BENIGN PROSTATIC HYPERPLASIA WITH URINARY RETENTION: ICD-10-CM

## 2019-01-09 DIAGNOSIS — I10 HTN (HYPERTENSION), BENIGN: ICD-10-CM

## 2019-01-09 DIAGNOSIS — M10.9 GOUT, ARTHRITIS: ICD-10-CM

## 2019-01-09 DIAGNOSIS — N40.1 BENIGN PROSTATIC HYPERPLASIA WITH URINARY RETENTION: ICD-10-CM

## 2019-01-09 DIAGNOSIS — Z23 NEED FOR VACCINATION: ICD-10-CM

## 2019-01-09 PROCEDURE — 90471 IMMUNIZATION ADMIN: CPT | Performed by: INTERNAL MEDICINE

## 2019-01-09 PROCEDURE — 99214 OFFICE O/P EST MOD 30 MIN: CPT | Mod: 25 | Performed by: INTERNAL MEDICINE

## 2019-01-09 PROCEDURE — 90662 IIV NO PRSV INCREASED AG IM: CPT | Performed by: INTERNAL MEDICINE

## 2019-01-09 RX ORDER — BENZONATATE 100 MG/1
100 CAPSULE ORAL 3 TIMES DAILY PRN
Qty: 30 CAP | Refills: 0 | Status: SHIPPED | OUTPATIENT
Start: 2019-01-09 | End: 2019-06-13

## 2019-01-09 RX ORDER — FLUTICASONE PROPIONATE 50 MCG
1 SPRAY, SUSPENSION (ML) NASAL DAILY
Qty: 16 G | Refills: 2 | Status: SHIPPED | OUTPATIENT
Start: 2019-01-09 | End: 2020-01-31 | Stop reason: SDUPTHER

## 2019-01-09 NOTE — PROGRESS NOTES
"CC: Follow-up blood sugars, hypertension, prostate.    HPI:   Parker presents today with the following.    1. IGT (impaired glucose tolerance)  He is overdue for recheck of blood work labs have been ordered but he is not yet completed.  Never truly been diabetic.    2. HTN (hypertension), benign  Blood pressures on upper end of normal he does report compliance to 2 drug therapy.    3. Benign prostatic hyperplasia with urinary retention  Does report getting up twice at night normal stream increased frequency during the day.  No pain no other associated symptoms    4. Gout, arthritis  Intermittent joint pains maintain on allopurinol coming due for recheck of labs.    5. Need for vaccination        Patient Active Problem List    Diagnosis Date Noted   • IGT (impaired glucose tolerance) 01/09/2019   • Renal insufficiency 06/09/2016   • Cataract, right eye 09/09/2014   • AR (allergic rhinitis) 09/13/2011   • Gout, arthritis 01/26/2011   • Benign prostatic hyperplasia with urinary retention 09/22/2010   • HTN (hypertension), benign 06/09/2010       Current Outpatient Prescriptions   Medication Sig Dispense Refill   • benzonatate (TESSALON) 100 MG Cap Take 1 Cap by mouth 3 times a day as needed for Cough. 30 Cap 0   • fluticasone (FLONASE) 50 MCG/ACT nasal spray Spray 1 Spray in nose every day. IN EACH NOSTRIL 16 g 2   • terazosin (HYTRIN) 5 MG Cap TAKE ONE CAPSULE BY MOUTH ONCE A DAY 90 Cap 2   • allopurinol (ZYLOPRIM) 100 MG Tab TAKE ONE TABLET BY MOUTH ONCE A DAY 90 Tab 2   • losartan (COZAAR) 50 MG Tab TAKE ONE TABLET BY MOUTH ONCE A DAY 30 Tab 10   • ASPIRIN 81 MG PO TABS Take 81 mg by mouth every day.       No current facility-administered medications for this visit.          Allergies as of 01/09/2019   • (No Known Allergies)        ROS: Denies Chest pain, SOB, LE edema.    /82 (BP Location: Right arm, Patient Position: Sitting)   Pulse 87   Temp 36.8 °C (98.3 °F)   Ht 1.651 m (5' 5\")   Wt 60.8 kg (134 " lb)   SpO2 96%   BMI 22.30 kg/m²     Physical Exam:  Gen:         Alert and oriented, No apparent distress.  Neck:        No Lymphadenopathy or Bruits.  Lungs:     Clear to auscultation bilaterally  CV:          Regular rate and rhythm. No murmurs, rubs or gallops.               Ext:          No clubbing, cyanosis, edema.      Assessment and Plan.   73 y.o. male with the following issues.    1. IGT (impaired glucose tolerance)  Recheck blood work  - COMP METABOLIC PANEL; Future  - Lipid Profile; Future  - HEMOGLOBIN A1C; Future    2. HTN (hypertension), benign  Continue to monitor    3. Benign prostatic hyperplasia with urinary retention  Continue Vogel and discussed lifestyle changes    4. Gout, arthritis  Continue allopurinol checking levels    5. Need for vaccination    - INFLUENZA VACCINE, HIGH DOSE (65+ ONLY)

## 2019-01-10 ENCOUNTER — HOSPITAL ENCOUNTER (OUTPATIENT)
Dept: LAB | Facility: MEDICAL CENTER | Age: 74
End: 2019-01-10
Attending: INTERNAL MEDICINE
Payer: COMMERCIAL

## 2019-01-10 DIAGNOSIS — R73.02 IGT (IMPAIRED GLUCOSE TOLERANCE): ICD-10-CM

## 2019-01-10 LAB
ALBUMIN SERPL BCP-MCNC: 4.1 G/DL (ref 3.2–4.9)
ALBUMIN/GLOB SERPL: 1.4 G/DL
ALP SERPL-CCNC: 63 U/L (ref 30–99)
ALT SERPL-CCNC: 11 U/L (ref 2–50)
ANION GAP SERPL CALC-SCNC: 6 MMOL/L (ref 0–11.9)
AST SERPL-CCNC: 18 U/L (ref 12–45)
BILIRUB SERPL-MCNC: 0.8 MG/DL (ref 0.1–1.5)
BUN SERPL-MCNC: 19 MG/DL (ref 8–22)
CALCIUM SERPL-MCNC: 9.1 MG/DL (ref 8.5–10.5)
CHLORIDE SERPL-SCNC: 107 MMOL/L (ref 96–112)
CHOLEST SERPL-MCNC: 168 MG/DL (ref 100–199)
CO2 SERPL-SCNC: 24 MMOL/L (ref 20–33)
CREAT SERPL-MCNC: 1.33 MG/DL (ref 0.5–1.4)
EST. AVERAGE GLUCOSE BLD GHB EST-MCNC: 120 MG/DL
FASTING STATUS PATIENT QL REPORTED: NORMAL
GLOBULIN SER CALC-MCNC: 2.9 G/DL (ref 1.9–3.5)
GLUCOSE SERPL-MCNC: 106 MG/DL (ref 65–99)
HBA1C MFR BLD: 5.8 % (ref 0–5.6)
HDLC SERPL-MCNC: 59 MG/DL
LDLC SERPL CALC-MCNC: 96 MG/DL
POTASSIUM SERPL-SCNC: 4.1 MMOL/L (ref 3.6–5.5)
PROT SERPL-MCNC: 7 G/DL (ref 6–8.2)
SODIUM SERPL-SCNC: 137 MMOL/L (ref 135–145)
TRIGL SERPL-MCNC: 65 MG/DL (ref 0–149)

## 2019-01-10 PROCEDURE — 80053 COMPREHEN METABOLIC PANEL: CPT

## 2019-01-10 PROCEDURE — 83036 HEMOGLOBIN GLYCOSYLATED A1C: CPT

## 2019-01-10 PROCEDURE — 36415 COLL VENOUS BLD VENIPUNCTURE: CPT

## 2019-01-10 PROCEDURE — 80061 LIPID PANEL: CPT

## 2019-02-07 RX ORDER — LOSARTAN POTASSIUM 50 MG/1
TABLET ORAL
Qty: 90 TAB | Refills: 2 | Status: SHIPPED | OUTPATIENT
Start: 2019-02-07 | End: 2020-01-22 | Stop reason: SDUPTHER

## 2019-06-13 ENCOUNTER — OFFICE VISIT (OUTPATIENT)
Dept: MEDICAL GROUP | Facility: MEDICAL CENTER | Age: 74
End: 2019-06-13
Payer: COMMERCIAL

## 2019-06-13 VITALS
WEIGHT: 130.29 LBS | HEIGHT: 65 IN | DIASTOLIC BLOOD PRESSURE: 62 MMHG | SYSTOLIC BLOOD PRESSURE: 100 MMHG | OXYGEN SATURATION: 94 % | BODY MASS INDEX: 21.71 KG/M2 | HEART RATE: 84 BPM | TEMPERATURE: 97.2 F

## 2019-06-13 DIAGNOSIS — R05.9 COUGH: ICD-10-CM

## 2019-06-13 PROCEDURE — 99213 OFFICE O/P EST LOW 20 MIN: CPT | Performed by: INTERNAL MEDICINE

## 2019-06-13 RX ORDER — AZITHROMYCIN 250 MG/1
250 TABLET, FILM COATED ORAL DAILY
Qty: 6 TAB | Refills: 0 | Status: SHIPPED | OUTPATIENT
Start: 2019-06-13 | End: 2019-06-18

## 2019-06-13 ASSESSMENT — PATIENT HEALTH QUESTIONNAIRE - PHQ9: CLINICAL INTERPRETATION OF PHQ2 SCORE: 0

## 2019-06-13 NOTE — PROGRESS NOTES
"      CC: Cough                                                                                                                                      HPI:   Parker presents today with the following.    1. Cough  Presents complaining of 1 month of cough productive of yellowish sputum worsening over the last week.  Reports a sore throat ear fullness no true fevers.  No other localizing infectious symptoms      Patient Active Problem List    Diagnosis Date Noted   • IGT (impaired glucose tolerance) 01/09/2019   • Renal insufficiency 06/09/2016   • Cataract, right eye 09/09/2014   • AR (allergic rhinitis) 09/13/2011   • Gout, arthritis 01/26/2011   • Benign prostatic hyperplasia with urinary retention 09/22/2010   • HTN (hypertension), benign 06/09/2010       Current Outpatient Prescriptions   Medication Sig Dispense Refill   • azithromycin (ZITHROMAX Z-CHRISTINE) 250 MG Tab Take 1 Tab by mouth every day for 5 days. Take 2 tabs on day 1 6 Tab 0   • losartan (COZAAR) 50 MG Tab TAKE ONE TABLET BY MOUTH ONCE A DAY 90 Tab 2   • fluticasone (FLONASE) 50 MCG/ACT nasal spray Spray 1 Spray in nose every day. IN EACH NOSTRIL 16 g 2   • terazosin (HYTRIN) 5 MG Cap TAKE ONE CAPSULE BY MOUTH ONCE A DAY 90 Cap 2   • allopurinol (ZYLOPRIM) 100 MG Tab TAKE ONE TABLET BY MOUTH ONCE A DAY 90 Tab 2   • ASPIRIN 81 MG PO TABS Take 81 mg by mouth every day.       No current facility-administered medications for this visit.          Allergies as of 06/13/2019   • (No Known Allergies)        ROS: Denies Chest pain, SOB, LE edema.    /62 (BP Location: Left arm, Patient Position: Sitting, BP Cuff Size: Adult)   Pulse 84   Temp 36.2 °C (97.2 °F) (Temporal)   Ht 1.651 m (5' 5\")   Wt 59.1 kg (130 lb 4.7 oz)   SpO2 94%   BMI 21.68 kg/m²     Physical Exam:  Gen:         Alert and oriented, No apparent distress.  Heent:       TMs clear bilaterally, oropharynx without erythema or exudates   neck:        No Lymphadenopathy or Bruits.  Lungs:     " Clear to auscultation bilaterally  CV:          Regular rate and rhythm. No murmurs, rubs or gallops.               Ext:          No clubbing, cyanosis, edema.      Assessment and Plan.   74 y.o. male with the following issues.    1. Cough  Likely viral in etiology. Have recommended over-the-counter pain control and symptom relief. Patient will followup if spiking fevers or symptoms worsen.  Given the duration of symptoms if they do not improve with over-the-counter symptom relief have sent an antibiotic to the pharmacy to have on hand.

## 2019-08-08 RX ORDER — TERAZOSIN 5 MG/1
CAPSULE ORAL
Qty: 90 CAP | Refills: 3 | Status: SHIPPED
Start: 2019-08-08 | End: 2020-07-25

## 2019-08-09 RX ORDER — FLUTICASONE PROPIONATE 50 MCG
SPRAY, SUSPENSION (ML) NASAL
Qty: 16 G | Refills: 1 | Status: SHIPPED | OUTPATIENT
Start: 2019-08-09 | End: 2019-12-20

## 2019-11-20 RX ORDER — ALLOPURINOL 100 MG/1
TABLET ORAL
Qty: 90 TAB | Refills: 2 | Status: SHIPPED
Start: 2019-11-20 | End: 2020-07-25

## 2019-12-20 RX ORDER — FLUTICASONE PROPIONATE 50 MCG
SPRAY, SUSPENSION (ML) NASAL
Qty: 16 G | Refills: 0 | Status: SHIPPED | OUTPATIENT
Start: 2019-12-20

## 2020-01-22 ENCOUNTER — OFFICE VISIT (OUTPATIENT)
Dept: MEDICAL GROUP | Facility: MEDICAL CENTER | Age: 75
End: 2020-01-22
Payer: COMMERCIAL

## 2020-01-22 VITALS
HEIGHT: 65 IN | OXYGEN SATURATION: 96 % | TEMPERATURE: 97.2 F | BODY MASS INDEX: 20.49 KG/M2 | SYSTOLIC BLOOD PRESSURE: 128 MMHG | DIASTOLIC BLOOD PRESSURE: 64 MMHG | HEART RATE: 94 BPM | WEIGHT: 123 LBS

## 2020-01-22 DIAGNOSIS — R05.9 COUGH: ICD-10-CM

## 2020-01-22 DIAGNOSIS — Z23 NEED FOR VACCINATION: ICD-10-CM

## 2020-01-22 DIAGNOSIS — I10 HTN (HYPERTENSION), BENIGN: ICD-10-CM

## 2020-01-22 DIAGNOSIS — Z13.6 SCREENING FOR CARDIOVASCULAR CONDITION: ICD-10-CM

## 2020-01-22 DIAGNOSIS — R73.02 IGT (IMPAIRED GLUCOSE TOLERANCE): ICD-10-CM

## 2020-01-22 PROCEDURE — 90662 IIV NO PRSV INCREASED AG IM: CPT | Performed by: INTERNAL MEDICINE

## 2020-01-22 PROCEDURE — 99214 OFFICE O/P EST MOD 30 MIN: CPT | Mod: 25 | Performed by: INTERNAL MEDICINE

## 2020-01-22 PROCEDURE — 90471 IMMUNIZATION ADMIN: CPT | Performed by: INTERNAL MEDICINE

## 2020-01-22 RX ORDER — AZITHROMYCIN 250 MG/1
250 TABLET, FILM COATED ORAL DAILY
Qty: 6 TAB | Refills: 0 | Status: SHIPPED | OUTPATIENT
Start: 2020-01-22 | End: 2020-01-27

## 2020-01-22 RX ORDER — LOSARTAN POTASSIUM 50 MG/1
50 TABLET ORAL DAILY
Qty: 90 TAB | Refills: 2 | Status: SHIPPED
Start: 2020-01-22 | End: 2020-07-25

## 2020-01-22 NOTE — PROGRESS NOTES
CC: Cough, blood sugars, hypertension.                                                                                                                                      HPI:   Parker presents today with the following.    1. Cough  Presents reporting 3 weeks of upper respiratory symptoms cough productive of greenish-yellow sputum.  Reports it is not improving in nature over the past week.  No earache mild sore throat no fevers no other localizing infectious symptoms.    2. IGT (impaired glucose tolerance)  History of elevated blood sugars he is on a 6-month schedule but has not had labs for the last year.    3. HTN (hypertension), benign  Maintain on blood pressure medication reports compliance well-controlled today.    Patient Active Problem List    Diagnosis Date Noted   • IGT (impaired glucose tolerance) 01/09/2019   • Renal insufficiency 06/09/2016   • Cataract, right eye 09/09/2014   • AR (allergic rhinitis) 09/13/2011   • Gout, arthritis 01/26/2011   • Benign prostatic hyperplasia with urinary retention 09/22/2010   • HTN (hypertension), benign 06/09/2010       Current Outpatient Medications   Medication Sig Dispense Refill   • azithromycin (ZITHROMAX Z-CHRISTINE) 250 MG Tab Take 1 Tab by mouth every day for 5 days. Take 2 tabs on day 1 6 Tab 0   • losartan (COZAAR) 50 MG Tab Take 1 Tab by mouth every day. 90 Tab 2   • fluticasone (FLONASE) 50 MCG/ACT nasal spray USE ONE SPRAY IN EACH NOSTRIL ONCE A DAY 16 g 0   • allopurinol (ZYLOPRIM) 100 MG Tab TAKE ONE TABLET BY MOUTH ONCE A DAY 90 Tab 2   • terazosin (HYTRIN) 5 MG Cap TAKE ONE CAPSULE BY MOUTH ONCE A DAY 90 Cap 3   • fluticasone (FLONASE) 50 MCG/ACT nasal spray Spray 1 Spray in nose every day. IN EACH NOSTRIL 16 g 2   • ASPIRIN 81 MG PO TABS Take 81 mg by mouth every day.       No current facility-administered medications for this visit.          Allergies as of 01/22/2020   • (No Known Allergies)        ROS: Denies Chest pain, SOB, LE edema.    BP  "128/64 (BP Location: Right arm, Patient Position: Sitting)   Pulse 94   Temp 36.2 °C (97.2 °F)   Ht 1.651 m (5' 5\")   Wt 55.8 kg (123 lb)   SpO2 96%   BMI 20.47 kg/m²     Physical Exam:  Gen:         Alert and oriented, No apparent distress.  Neck:        No Lymphadenopathy or Bruits.  Lungs:     Clear to auscultation bilaterally  CV:          Regular rate and rhythm. No murmurs, rubs or gallops.               Ext:          No clubbing, cyanosis, edema.      Assessment and Plan.   74 y.o. male with the following issues.    1. Cough  Given duration of symptoms have given a Z-Felix over-the-counter symptom relief recommended.    2. IGT (impaired glucose tolerance)  Have given a lab slip.  - HEMOGLOBIN A1C; Future    3. HTN (hypertension), benign  Currently well controlled, Discuss diet, exercise and salt restriction.  No change to medication therapy.    4. Screening for cardiovascular condition    - Comp Metabolic Panel; Future  - Lipid Profile; Future    5. Need for vaccination    - INFLUENZA VACCINE, HIGH DOSE (65+ ONLY)      "

## 2020-01-31 RX ORDER — FLUTICASONE PROPIONATE 50 MCG
1 SPRAY, SUSPENSION (ML) NASAL DAILY
Qty: 16 G | Refills: 11 | Status: SHIPPED
Start: 2020-01-31 | End: 2020-07-25

## 2020-07-15 ENCOUNTER — APPOINTMENT (OUTPATIENT)
Dept: RADIOLOGY | Facility: MEDICAL CENTER | Age: 75
End: 2020-07-15
Attending: EMERGENCY MEDICINE
Payer: COMMERCIAL

## 2020-07-15 ENCOUNTER — HOSPITAL ENCOUNTER (EMERGENCY)
Facility: MEDICAL CENTER | Age: 75
End: 2020-07-15
Attending: EMERGENCY MEDICINE
Payer: COMMERCIAL

## 2020-07-15 VITALS
HEART RATE: 86 BPM | TEMPERATURE: 99.6 F | OXYGEN SATURATION: 98 % | HEIGHT: 66 IN | SYSTOLIC BLOOD PRESSURE: 109 MMHG | RESPIRATION RATE: 19 BRPM | DIASTOLIC BLOOD PRESSURE: 61 MMHG | WEIGHT: 125 LBS | BODY MASS INDEX: 20.09 KG/M2

## 2020-07-15 DIAGNOSIS — J20.9 ACUTE BRONCHITIS, UNSPECIFIED ORGANISM: ICD-10-CM

## 2020-07-15 DIAGNOSIS — Z20.822 SUSPECTED COVID-19 VIRUS INFECTION: ICD-10-CM

## 2020-07-15 LAB
ALBUMIN SERPL BCP-MCNC: 3.5 G/DL (ref 3.2–4.9)
ALBUMIN/GLOB SERPL: 1.7 G/DL
ALP SERPL-CCNC: 46 U/L (ref 30–99)
ALT SERPL-CCNC: 13 U/L (ref 2–50)
ANION GAP SERPL CALC-SCNC: 14 MMOL/L (ref 7–16)
AST SERPL-CCNC: 17 U/L (ref 12–45)
BASOPHILS # BLD AUTO: 0 % (ref 0–1.8)
BASOPHILS # BLD: 0 K/UL (ref 0–0.12)
BILIRUB SERPL-MCNC: 0.3 MG/DL (ref 0.1–1.5)
BUN SERPL-MCNC: 36 MG/DL (ref 8–22)
CALCIUM SERPL-MCNC: 7.9 MG/DL (ref 8.5–10.5)
CHLORIDE SERPL-SCNC: 108 MMOL/L (ref 96–112)
CO2 SERPL-SCNC: 17 MMOL/L (ref 20–33)
COVID ORDER STATUS COVID19: NORMAL
CREAT SERPL-MCNC: 1.8 MG/DL (ref 0.5–1.4)
CRP SERPL HS-MCNC: 2.24 MG/DL (ref 0–0.75)
D DIMER PPP IA.FEU-MCNC: 0.45 UG/ML (FEU) (ref 0–0.5)
EOSINOPHIL # BLD AUTO: 0 K/UL (ref 0–0.51)
EOSINOPHIL NFR BLD: 0 % (ref 0–6.9)
ERYTHROCYTE [DISTWIDTH] IN BLOOD BY AUTOMATED COUNT: 53.9 FL (ref 35.9–50)
ERYTHROCYTE [SEDIMENTATION RATE] IN BLOOD BY WESTERGREN METHOD: 18 MM/HOUR (ref 0–20)
GLOBULIN SER CALC-MCNC: 2.1 G/DL (ref 1.9–3.5)
GLUCOSE SERPL-MCNC: 136 MG/DL (ref 65–99)
HCT VFR BLD AUTO: 21.7 % (ref 42–52)
HGB BLD-MCNC: 6.9 G/DL (ref 14–18)
IMM GRANULOCYTES # BLD AUTO: 0.03 K/UL (ref 0–0.11)
IMM GRANULOCYTES NFR BLD AUTO: 0.8 % (ref 0–0.9)
LACTATE BLD-SCNC: 1.7 MMOL/L (ref 0.5–2)
LDH SERPL L TO P-CCNC: 179 U/L (ref 107–266)
LYMPHOCYTES # BLD AUTO: 0.38 K/UL (ref 1–4.8)
LYMPHOCYTES NFR BLD: 9.6 % (ref 22–41)
MCH RBC QN AUTO: 31.2 PG (ref 27–33)
MCHC RBC AUTO-ENTMCNC: 31.8 G/DL (ref 33.7–35.3)
MCV RBC AUTO: 98.2 FL (ref 81.4–97.8)
MONOCYTES # BLD AUTO: 0.32 K/UL (ref 0–0.85)
MONOCYTES NFR BLD AUTO: 8.1 % (ref 0–13.4)
NEUTROPHILS # BLD AUTO: 3.23 K/UL (ref 1.82–7.42)
NEUTROPHILS NFR BLD: 81.5 % (ref 44–72)
NRBC # BLD AUTO: 0 K/UL
NRBC BLD-RTO: 0 /100 WBC
PLATELET # BLD AUTO: 126 K/UL (ref 164–446)
PMV BLD AUTO: 9.2 FL (ref 9–12.9)
POTASSIUM SERPL-SCNC: 4 MMOL/L (ref 3.6–5.5)
PROT SERPL-MCNC: 5.6 G/DL (ref 6–8.2)
RBC # BLD AUTO: 2.21 M/UL (ref 4.7–6.1)
SODIUM SERPL-SCNC: 139 MMOL/L (ref 135–145)
WBC # BLD AUTO: 4 K/UL (ref 4.8–10.8)

## 2020-07-15 PROCEDURE — 83605 ASSAY OF LACTIC ACID: CPT

## 2020-07-15 PROCEDURE — 99284 EMERGENCY DEPT VISIT MOD MDM: CPT

## 2020-07-15 PROCEDURE — U0003 INFECTIOUS AGENT DETECTION BY NUCLEIC ACID (DNA OR RNA); SEVERE ACUTE RESPIRATORY SYNDROME CORONAVIRUS 2 (SARS-COV-2) (CORONAVIRUS DISEASE [COVID-19]), AMPLIFIED PROBE TECHNIQUE, MAKING USE OF HIGH THROUGHPUT TECHNOLOGIES AS DESCRIBED BY CMS-2020-01-R: HCPCS

## 2020-07-15 PROCEDURE — 700102 HCHG RX REV CODE 250 W/ 637 OVERRIDE(OP): Performed by: EMERGENCY MEDICINE

## 2020-07-15 PROCEDURE — 86140 C-REACTIVE PROTEIN: CPT

## 2020-07-15 PROCEDURE — 83615 LACTATE (LD) (LDH) ENZYME: CPT

## 2020-07-15 PROCEDURE — 96367 TX/PROPH/DG ADDL SEQ IV INF: CPT

## 2020-07-15 PROCEDURE — 96365 THER/PROPH/DIAG IV INF INIT: CPT

## 2020-07-15 PROCEDURE — 85025 COMPLETE CBC W/AUTO DIFF WBC: CPT

## 2020-07-15 PROCEDURE — 700111 HCHG RX REV CODE 636 W/ 250 OVERRIDE (IP): Performed by: EMERGENCY MEDICINE

## 2020-07-15 PROCEDURE — 87040 BLOOD CULTURE FOR BACTERIA: CPT | Mod: 91

## 2020-07-15 PROCEDURE — A9270 NON-COVERED ITEM OR SERVICE: HCPCS | Performed by: EMERGENCY MEDICINE

## 2020-07-15 PROCEDURE — 85652 RBC SED RATE AUTOMATED: CPT

## 2020-07-15 PROCEDURE — 700105 HCHG RX REV CODE 258: Performed by: EMERGENCY MEDICINE

## 2020-07-15 PROCEDURE — C9803 HOPD COVID-19 SPEC COLLECT: HCPCS | Performed by: EMERGENCY MEDICINE

## 2020-07-15 PROCEDURE — 71045 X-RAY EXAM CHEST 1 VIEW: CPT

## 2020-07-15 PROCEDURE — 80053 COMPREHEN METABOLIC PANEL: CPT

## 2020-07-15 PROCEDURE — 85379 FIBRIN DEGRADATION QUANT: CPT

## 2020-07-15 RX ORDER — ACETAMINOPHEN 325 MG/1
650 TABLET ORAL ONCE
Status: COMPLETED | OUTPATIENT
Start: 2020-07-15 | End: 2020-07-15

## 2020-07-15 RX ORDER — AZITHROMYCIN 500 MG/1
500 INJECTION, POWDER, LYOPHILIZED, FOR SOLUTION INTRAVENOUS ONCE
Status: COMPLETED | OUTPATIENT
Start: 2020-07-15 | End: 2020-07-15

## 2020-07-15 RX ORDER — SODIUM CHLORIDE 9 MG/ML
1000 INJECTION, SOLUTION INTRAVENOUS ONCE
Status: COMPLETED | OUTPATIENT
Start: 2020-07-15 | End: 2020-07-15

## 2020-07-15 RX ORDER — AZITHROMYCIN 250 MG/1
250 TABLET, FILM COATED ORAL DAILY
Qty: 6 TAB | Refills: 0 | Status: SHIPPED | OUTPATIENT
Start: 2020-07-15 | End: 2020-07-20

## 2020-07-15 RX ADMIN — AZITHROMYCIN MONOHYDRATE 500 MG: 500 INJECTION, POWDER, LYOPHILIZED, FOR SOLUTION INTRAVENOUS at 15:50

## 2020-07-15 RX ADMIN — SODIUM CHLORIDE 1000 ML: 9 INJECTION, SOLUTION INTRAVENOUS at 13:39

## 2020-07-15 RX ADMIN — CEFTRIAXONE SODIUM 2 G: 2 INJECTION, POWDER, FOR SOLUTION INTRAMUSCULAR; INTRAVENOUS at 15:12

## 2020-07-15 RX ADMIN — ACETAMINOPHEN 650 MG: 325 TABLET, FILM COATED ORAL at 13:39

## 2020-07-15 ASSESSMENT — FIBROSIS 4 INDEX: FIB4 SCORE: 2.88

## 2020-07-15 NOTE — ED PROVIDER NOTES
ED Provider Note    CHIEF COMPLAINT  Chief Complaint   Patient presents with   • Shortness of Breath   • Dizziness       HPI  Parker Kern is a 75 y.o. male who presents for evaluation of fever shortness of breath and dizziness.  The patient has a history of hypertension as well as mild COPD.  He works at the [employer].  He has been exposed to significant numbers of crowds.  He is noted to be febrile in triage and immediately brought back.  He reports fevers chills dizziness.  No strokelike symptoms such as numbness weakness tingling to the arms legs or face.  No associated headache or neck stiffness.  No known exposure to COVID-19 positive patients with the patient works in a busy environment    REVIEW OF SYSTEMS  See HPI for further details.  Positive for fevers cough congestion all other systems are negative.     PAST MEDICAL HISTORY  Past Medical History:   Diagnosis Date   • Allergic rhinitis    • BPH    • Chronic allergic rhinitis    • COPD    • Glucose intolerance (impaired glucose tolerance)    • HTN        FAMILY HISTORY  Noncontributory    SOCIAL HISTORY  Social History     Socioeconomic History   • Marital status:      Spouse name: Not on file   • Number of children: Not on file   • Years of education: Not on file   • Highest education level: Not on file   Occupational History   • Not on file   Social Needs   • Financial resource strain: Not on file   • Food insecurity     Worry: Not on file     Inability: Not on file   • Transportation needs     Medical: Not on file     Non-medical: Not on file   Tobacco Use   • Smoking status: Former Smoker     Packs/day: 0.10     Years: 36.00     Pack years: 3.60     Types: Cigarettes     Last attempt to quit: 1995     Years since quittin.5   • Smokeless tobacco: Never Used   • Tobacco comment: started at 14/ 1 cig per day when he did smoke   Substance and Sexual Activity   • Alcohol use: Yes     Alcohol/week: 0.6 oz     Types: 1  "Cans of beer per week     Comment: rarely   • Drug use: No   • Sexual activity: Never     Partners: Female   Lifestyle   • Physical activity     Days per week: Not on file     Minutes per session: Not on file   • Stress: Not on file   Relationships   • Social connections     Talks on phone: Not on file     Gets together: Not on file     Attends [employer] service: Not on file     Active member of club or organization: Not on file     Attends meetings of clubs or organizations: Not on file     Relationship status: Not on file   • Intimate partner violence     Fear of current or ex partner: Not on file     Emotionally abused: Not on file     Physically abused: Not on file     Forced sexual activity: Not on file   Other Topics Concern   • Not on file   Social History Narrative   • Not on file       SURGICAL HISTORY  Past Surgical History:   Procedure Laterality Date   • CATARACT PHACO WITH IOL  9/9/2014    Performed by Nithin Morrow M.D. at [facility]       CURRENT MEDICATIONS  No current facility-administered medications for this encounter.     Current Outpatient Medications:   •  fluticasone (FLONASE) 50 MCG/ACT nasal spray, Spray 1 Spray in nose every day. IN EACH NOSTRIL, Disp: 16 g, Rfl: 11  •  losartan (COZAAR) 50 MG Tab, Take 1 Tab by mouth every day., Disp: 90 Tab, Rfl: 2  •  fluticasone (FLONASE) 50 MCG/ACT nasal spray, USE ONE SPRAY IN EACH NOSTRIL ONCE A DAY, Disp: 16 g, Rfl: 0  •  allopurinol (ZYLOPRIM) 100 MG Tab, TAKE ONE TABLET BY MOUTH ONCE A DAY, Disp: 90 Tab, Rfl: 2  •  terazosin (HYTRIN) 5 MG Cap, TAKE ONE CAPSULE BY MOUTH ONCE A DAY, Disp: 90 Cap, Rfl: 3  •  ASPIRIN 81 MG PO TABS, Take 81 mg by mouth every day., Disp: , Rfl:       ALLERGIES  No Known Allergies    PHYSICAL EXAM  VITAL SIGNS: /58   Pulse 78   Temp 37.6 °C (99.6 °F) (Oral)   Resp 16   Ht 1.676 m (5' 6\")   SpO2 97%   BMI 19.85 kg/m²       Constitutional: Well developed, Well nourished, No acute distress, " Non-toxic appearance.   HENT: Normocephalic, Atraumatic, Bilateral external ears normal, dry mucous membranes, No oral exudates, Nose normal.   Eyes: PERRLA, EOMI, Conjunctiva normal, No discharge.   Neck: Normal range of motion, No tenderness, Supple, No stridor.    Cardiovascular: Mild tachycardia, Normal rhythm, No murmurs, No rubs, No gallops.   Thorax & Lungs: Normal breath sounds, No respiratory distress, No wheezing, No chest tenderness.   Abdomen: Bowel sounds normal, Soft, No tenderness, No masses, No pulsatile masses.   Skin: Warm, Dry, No erythema, No rash.   Back: No tenderness, No CVA tenderness.   Extremities: Intact distal pulses, No edema, No tenderness, No cyanosis, No clubbing.   Neurologic: Alert & oriented x 3, Normal motor function, Normal sensory function, No focal deficits noted.   Psychiatric: Affect normal, Judgment normal, Mood normal.       DX-CHEST-PORTABLE (1 VIEW)   Final Result      COPD without acute cardiopulmonary abnormality.        Results for orders placed or performed during the hospital encounter of 07/15/20   LACTIC ACID   Result Value Ref Range    Lactic Acid 1.7 0.5 - 2.0 mmol/L   CRP QUANTITIVE (NON-CARDIAC)   Result Value Ref Range    Stat C-Reactive Protein 2.24 (H) 0.00 - 0.75 mg/dL   Sed Rate   Result Value Ref Range    Sed Rate Westergren 18 0 - 20 mm/hour   CBC WITH DIFFERENTIAL   Result Value Ref Range    WBC 4.0 (L) 4.8 - 10.8 K/uL    RBC 2.21 (L) 4.70 - 6.10 M/uL    Hemoglobin 6.9 (L) 14.0 - 18.0 g/dL    Hematocrit 21.7 (L) 42.0 - 52.0 %    MCV 98.2 (H) 81.4 - 97.8 fL    MCH 31.2 27.0 - 33.0 pg    MCHC 31.8 (L) 33.7 - 35.3 g/dL    RDW 53.9 (H) 35.9 - 50.0 fL    Platelet Count 126 (L) 164 - 446 K/uL    MPV 9.2 9.0 - 12.9 fL    Neutrophils-Polys 81.50 (H) 44.00 - 72.00 %    Lymphocytes 9.60 (L) 22.00 - 41.00 %    Monocytes 8.10 0.00 - 13.40 %    Eosinophils 0.00 0.00 - 6.90 %    Basophils 0.00 0.00 - 1.80 %    Immature Granulocytes 0.80 0.00 - 0.90 %    Nucleated RBC  0.00 /100 WBC    Neutrophils (Absolute) 3.23 1.82 - 7.42 K/uL    Lymphs (Absolute) 0.38 (L) 1.00 - 4.80 K/uL    Monos (Absolute) 0.32 0.00 - 0.85 K/uL    Eos (Absolute) 0.00 0.00 - 0.51 K/uL    Baso (Absolute) 0.00 0.00 - 0.12 K/uL    Immature Granulocytes (abs) 0.03 0.00 - 0.11 K/uL    NRBC (Absolute) 0.00 K/uL   Comp Metabolic Panel   Result Value Ref Range    Sodium 139 135 - 145 mmol/L    Potassium 4.0 3.6 - 5.5 mmol/L    Chloride 108 96 - 112 mmol/L    Co2 17 (L) 20 - 33 mmol/L    Anion Gap 14.0 7.0 - 16.0    Glucose 136 (H) 65 - 99 mg/dL    Bun 36 (H) 8 - 22 mg/dL    Creatinine 1.80 (H) 0.50 - 1.40 mg/dL    Calcium 7.9 (L) 8.5 - 10.5 mg/dL    AST(SGOT) 17 12 - 45 U/L    ALT(SGPT) 13 2 - 50 U/L    Alkaline Phosphatase 46 30 - 99 U/L    Total Bilirubin 0.3 0.1 - 1.5 mg/dL    Albumin 3.5 3.2 - 4.9 g/dL    Total Protein 5.6 (L) 6.0 - 8.2 g/dL    Globulin 2.1 1.9 - 3.5 g/dL    A-G Ratio 1.7 g/dL   COVID/SARS CoV-2 PCR    Specimen: Nasopharyngeal; Respirate   Result Value Ref Range    COVID Order Status Received    LDH   Result Value Ref Range    LDH Total 179 107 - 266 U/L   SARS-CoV-2, PCR (In-House)   Result Value Ref Range    SARS-CoV-2 Source NP Swab    D-DIMER   Result Value Ref Range    D-Dimer Screen 0.45 0.00 - 0.50 ug/mL (FEU)   ESTIMATED GFR   Result Value Ref Range    GFR If  44 (A) >60 mL/min/1.73 m 2    GFR If Non  37 (A) >60 mL/min/1.73 m 2      COURSE & MEDICAL DECISION MAKING  Pertinent Labs & Imaging studies reviewed. (See chart for details)  Septic protocol was initiated.  The patient was given Tylenol and IV fluids.  He was clinically dehydrated with an elevated BUN and creatinine.  His chest x-ray is clear.  His COVID-19 work-up is indeterminate.  He has lymphopenia but his LDH is normal d-dimer is normal and he has a normal chest x-ray.  He is not hypoxic.  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  He feels much better after fluids and Tylenol  and his blood pressure improved and temperature came down.  He is quite adamant about going home.  I counseled him that he could get significantly worse.  We will not make him sign AMA because I think that this is initially reasonable.  The patient has medical decision-making capability.  He was given IV Rocephin and azithromycin after blood cultures were performed.  The patient will be contacted in 1 to 2 days if his COVID test is positive.  I will continue him on azithromycin.  This could be a simple bronchitis with mild COPD exacerbation.  He has no increased work of breathing.    FINAL IMPRESSION  1.  SIRS  2.  Bronchitis  3.  Suspected COVID-19 infection  4.  Prerenal insufficiency      Electronically signed by: Tej Abbott M.D., 7/15/2020 1:33 PM

## 2020-07-15 NOTE — LETTER
07/16/20    ATTN: Parker Sunshine Concha  [address]  [place] [address]  1945    Dear Parker Samarlon Kern,  Estimado/a Parker Sunshine Concha,    We attempted to reach you without success and are sorry to have missed you.   Hemos intentado comunicarnos con usted, margoth no hemos tenido éxito.    This letter is to inform you that your COVID-19 test result is POSITIVE.  Esta carta es para informarle que el resultado de mercer prueba nasal COVID-19 es POSITIVA.    This means that the virus that causes COVID-19 was found in your sample.  Alleghany quiere decir que el virus que causa el COVID-19 está presente en mercer prueba nasal.    The Health Department will be in contact with you soon.  El departamento de lino se pondrá en contacto con usted.    You are encouraged to continue to isolate according to the CDC guidance unless otherwise directed by the Health Department.  Per the CDC, you should continue to quarantine until:   Se le recomienda permanecer en cuarentena y autoaislarse de acuerdo con las pautas del [facility] el Control y la Prevención de Enfermedad ([facility], por augustine siglas en inglés) a menos que el departamento de lino le indique otra cosa. De acuerdo con el CDC debe continuar en cuarentena hasta que:    · At least 10 days since your symptoms first started  · Por lo menos 10 días desde augustine primeros síntomas  · At least (72 hours) have passed since your fever resolved without the use of fever-  · reducing medications  · Por lo menos (72 horas) sin fiebre sin el uso de medicamentos para reducir la fiebre  · You had improvement in your cough and shortness of breath. You should also remain quarantined until at least 10 days have passed since your symptoms first appeared. There is a slight possibility that you may still be contagious after 10 days, so please continue to use excellent hand washing and masking after you leave isolation.  · Ha manda la tos y la dificultad respiratoria. Debe permanecer  en cuarentena por lo menos 10 días después de presentar los primeros síntomas. Cabe la posibilidad de que aún después de los 10 días pueda ser contagioso/a así que debe mantener buena higiene de connie y continuar usando cubrebocas después de terminar el autoaislamiento.    For any further questions regarding COVID-19, please contact  [facility] at [phone].    Para más preguntas acerca del COVID-19, Distrito de lino del condado de [facility] [phone].    Thank you for your cooperation in the matter.  Ely por mercer colaboración.    Noreen James, PharmD  Ph: [phone]  [facility]

## 2020-07-15 NOTE — ED TRIAGE NOTES
Pt from home with CC of SOB and dizziness with exertion x about 3 days. 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 Admits to dry cough and nasal congestion. +fever

## 2020-07-16 LAB
SARS-COV-2 RNA RESP QL NAA+PROBE: DETECTED
SPECIMEN SOURCE: ABNORMAL

## 2020-07-16 NOTE — ED NOTES
Pt discharged home as ordered by erp. Pt instructed to follow up with their PCP and return here as need. Pt given RXs and instructed no driving/drinking on pain medications. Pt verbalized understanding a cab was called for pt per request of pts son

## 2020-07-16 NOTE — ED NOTES
COVID-19 Test Follow-Up  07/16/20    SARS-CoV-2 Source  NP Swab     SARS-CoV-2 by PCR  DETECTEDPanic        Patient is positive for COVID-19.    I have informed the patient of the positive result by phone and that the Health Dept would be in contact soon. Instructed them to continue to quarantine and self-isolate according with the CDC guidance or as otherwise directed by the Health Dept.    Per the CDC, he should continue to quarantine until: At least 3 days (72 hours) have passed since recovery defined as resolution of fever without the use of fever-reducing medications and improvement in respiratory symptoms (e.g., cough, shortness of breath); and, At least 10 days have passed since symptoms first appeared.  He states that he continues to be short of breath and dizzy especially when walking. He is also having a slight fever and is very weak. I have encouraged him that if he is worsening or having trouble breathing then he should return to the ER.   He has asked for me to send him a letter in the mail to notify of the above information. I have done this.     Noreen James, PharmD

## 2020-07-20 LAB
BACTERIA BLD CULT: NORMAL
BACTERIA BLD CULT: NORMAL
SIGNIFICANT IND 70042: NORMAL
SIGNIFICANT IND 70042: NORMAL
SITE SITE: NORMAL
SITE SITE: NORMAL
SOURCE SOURCE: NORMAL
SOURCE SOURCE: NORMAL

## 2020-07-25 ENCOUNTER — APPOINTMENT (OUTPATIENT)
Dept: RADIOLOGY | Facility: MEDICAL CENTER | Age: 75
End: 2020-07-25
Attending: EMERGENCY MEDICINE
Payer: COMMERCIAL

## 2020-07-25 ENCOUNTER — HOSPITAL ENCOUNTER (EMERGENCY)
Facility: MEDICAL CENTER | Age: 75
End: 2020-07-25
Attending: EMERGENCY MEDICINE
Payer: COMMERCIAL

## 2020-07-25 VITALS
HEIGHT: 66 IN | BODY MASS INDEX: 21.69 KG/M2 | OXYGEN SATURATION: 98 % | SYSTOLIC BLOOD PRESSURE: 102 MMHG | WEIGHT: 135 LBS | HEART RATE: 80 BPM | DIASTOLIC BLOOD PRESSURE: 63 MMHG | RESPIRATION RATE: 20 BRPM | TEMPERATURE: 99.3 F

## 2020-07-25 DIAGNOSIS — R06.02 SHORTNESS OF BREATH: ICD-10-CM

## 2020-07-25 DIAGNOSIS — U07.1 COVID-19 VIRUS DETECTED: ICD-10-CM

## 2020-07-25 LAB
ALBUMIN SERPL BCP-MCNC: 3.3 G/DL (ref 3.2–4.9)
ALBUMIN/GLOB SERPL: 1.1 G/DL
ALP SERPL-CCNC: 75 U/L (ref 30–99)
ALT SERPL-CCNC: 34 U/L (ref 2–50)
ANION GAP SERPL CALC-SCNC: 16 MMOL/L (ref 7–16)
AST SERPL-CCNC: 31 U/L (ref 12–45)
BASOPHILS # BLD AUTO: 0.2 % (ref 0–1.8)
BASOPHILS # BLD: 0.01 K/UL (ref 0–0.12)
BILIRUB SERPL-MCNC: 0.5 MG/DL (ref 0.1–1.5)
BLOOD CULTURE HOLD CXBCH: NORMAL
BUN SERPL-MCNC: 18 MG/DL (ref 8–22)
CALCIUM SERPL-MCNC: 8.5 MG/DL (ref 8.5–10.5)
CHLORIDE SERPL-SCNC: 104 MMOL/L (ref 96–112)
CO2 SERPL-SCNC: 18 MMOL/L (ref 20–33)
CREAT SERPL-MCNC: 1.52 MG/DL (ref 0.5–1.4)
EKG IMPRESSION: NORMAL
EOSINOPHIL # BLD AUTO: 0.01 K/UL (ref 0–0.51)
EOSINOPHIL NFR BLD: 0.2 % (ref 0–6.9)
ERYTHROCYTE [DISTWIDTH] IN BLOOD BY AUTOMATED COUNT: 47.2 FL (ref 35.9–50)
GLOBULIN SER CALC-MCNC: 2.9 G/DL (ref 1.9–3.5)
GLUCOSE SERPL-MCNC: 120 MG/DL (ref 65–99)
HCT VFR BLD AUTO: 23.6 % (ref 42–52)
HGB BLD-MCNC: 7.6 G/DL (ref 14–18)
IMM GRANULOCYTES # BLD AUTO: 0.03 K/UL (ref 0–0.11)
IMM GRANULOCYTES NFR BLD AUTO: 0.6 % (ref 0–0.9)
LACTATE BLD-SCNC: 1.8 MMOL/L (ref 0.5–2)
LYMPHOCYTES # BLD AUTO: 0.54 K/UL (ref 1–4.8)
LYMPHOCYTES NFR BLD: 10 % (ref 22–41)
MCH RBC QN AUTO: 30.8 PG (ref 27–33)
MCHC RBC AUTO-ENTMCNC: 32.2 G/DL (ref 33.7–35.3)
MCV RBC AUTO: 95.5 FL (ref 81.4–97.8)
MONOCYTES # BLD AUTO: 0.36 K/UL (ref 0–0.85)
MONOCYTES NFR BLD AUTO: 6.7 % (ref 0–13.4)
NEUTROPHILS # BLD AUTO: 4.46 K/UL (ref 1.82–7.42)
NEUTROPHILS NFR BLD: 82.3 % (ref 44–72)
NRBC # BLD AUTO: 0 K/UL
NRBC BLD-RTO: 0 /100 WBC
NT-PROBNP SERPL IA-MCNC: 216 PG/ML (ref 0–125)
PLATELET # BLD AUTO: 367 K/UL (ref 164–446)
PMV BLD AUTO: 9.3 FL (ref 9–12.9)
POTASSIUM SERPL-SCNC: 4.2 MMOL/L (ref 3.6–5.5)
PROT SERPL-MCNC: 6.2 G/DL (ref 6–8.2)
RBC # BLD AUTO: 2.47 M/UL (ref 4.7–6.1)
SODIUM SERPL-SCNC: 138 MMOL/L (ref 135–145)
TROPONIN T SERPL-MCNC: 28 NG/L (ref 6–19)
WBC # BLD AUTO: 5.4 K/UL (ref 4.8–10.8)

## 2020-07-25 PROCEDURE — 93005 ELECTROCARDIOGRAM TRACING: CPT

## 2020-07-25 PROCEDURE — 80053 COMPREHEN METABOLIC PANEL: CPT

## 2020-07-25 PROCEDURE — 93005 ELECTROCARDIOGRAM TRACING: CPT | Performed by: EMERGENCY MEDICINE

## 2020-07-25 PROCEDURE — 99285 EMERGENCY DEPT VISIT HI MDM: CPT

## 2020-07-25 PROCEDURE — 83880 ASSAY OF NATRIURETIC PEPTIDE: CPT

## 2020-07-25 PROCEDURE — 700105 HCHG RX REV CODE 258: Performed by: EMERGENCY MEDICINE

## 2020-07-25 PROCEDURE — 85025 COMPLETE CBC W/AUTO DIFF WBC: CPT

## 2020-07-25 PROCEDURE — 700117 HCHG RX CONTRAST REV CODE 255: Performed by: EMERGENCY MEDICINE

## 2020-07-25 PROCEDURE — 84484 ASSAY OF TROPONIN QUANT: CPT

## 2020-07-25 PROCEDURE — 71045 X-RAY EXAM CHEST 1 VIEW: CPT

## 2020-07-25 PROCEDURE — 71275 CT ANGIOGRAPHY CHEST: CPT

## 2020-07-25 PROCEDURE — 83605 ASSAY OF LACTIC ACID: CPT

## 2020-07-25 RX ORDER — METHYLPREDNISOLONE 4 MG/1
TABLET ORAL
Qty: 1 EACH | Refills: 0 | Status: SHIPPED | OUTPATIENT
Start: 2020-07-25

## 2020-07-25 RX ORDER — AZITHROMYCIN 250 MG/1
250 TABLET, FILM COATED ORAL DAILY
Qty: 6 TAB | Refills: 0 | Status: SHIPPED | OUTPATIENT
Start: 2020-07-25 | End: 2020-07-30

## 2020-07-25 RX ORDER — TERAZOSIN 5 MG/1
5 CAPSULE ORAL NIGHTLY
COMMUNITY
End: 2020-08-14

## 2020-07-25 RX ORDER — SODIUM CHLORIDE, SODIUM LACTATE, POTASSIUM CHLORIDE, CALCIUM CHLORIDE 600; 310; 30; 20 MG/100ML; MG/100ML; MG/100ML; MG/100ML
1000 INJECTION, SOLUTION INTRAVENOUS ONCE
Status: COMPLETED | OUTPATIENT
Start: 2020-07-25 | End: 2020-07-25

## 2020-07-25 RX ORDER — AZITHROMYCIN 250 MG/1
250-500 TABLET, FILM COATED ORAL SEE ADMIN INSTRUCTIONS
COMMUNITY
Start: 2020-07-15

## 2020-07-25 RX ADMIN — IOHEXOL 54 ML: 350 INJECTION, SOLUTION INTRAVENOUS at 14:58

## 2020-07-25 RX ADMIN — SODIUM CHLORIDE, POTASSIUM CHLORIDE, SODIUM LACTATE AND CALCIUM CHLORIDE 1000 ML: 600; 310; 30; 20 INJECTION, SOLUTION INTRAVENOUS at 15:01

## 2020-07-25 ASSESSMENT — FIBROSIS 4 INDEX: FIB4 SCORE: 2.88

## 2020-07-25 NOTE — ED NOTES
Med Rec Updated PARTIALLY per family over the phone and home pharmacy  Allergies Reviewed Historically    Per Home Pharmacy Pt picked up a Zpak on 07/15/20.     Unknown last doses.

## 2020-07-25 NOTE — ED PROVIDER NOTES
ED Provider Note    Scribed for Christiano Connolly M.D. by Manisha Sanchez. 2020, 1:51 PM.    Primary care provider: Jose Maher M.D.  Means of arrival: Ambulance  History obtained from: patient  History limited by: none    CHIEF COMPLAINT  Chief Complaint   Patient presents with   • Shortness of Breath     BIB [facility] from home for SOB with exertion. Pt diagnosed Covid positive on .        HPI  Parker Kern is a 75 y.o. male who presents to the Emergency Department via EMS for worsening shortness of breath onset today. Patient was diagnosed as COVID positive on . He describes that he was taking the trash out at work and developed shortness of breath. 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 He is only short of breath with exertion.  Patient has had the symptoms since he was diagnosed on the 15 of this month with COVID just states that he is getting slightly worse.  Patient denies any overt fevers does describe exertional shortness of breath denies any overt chest pain or chills or any other symptoms.      REVIEW OF SYSTEMS  As above otherwise all other systems are negative    PAST MEDICAL HISTORY   has a past medical history of Allergic rhinitis, BPH, Chronic allergic rhinitis, COPD, Glucose intolerance (impaired glucose tolerance), and HTN.    SURGICAL HISTORY   has a past surgical history that includes cataract phaco with iol (2014).    SOCIAL HISTORY  Social History     Tobacco Use   • Smoking status: Former Smoker     Packs/day: 0.10     Years: 36.00     Pack years: 3.60     Types: Cigarettes     Last attempt to quit: 1995     Years since quittin.5   • Smokeless tobacco: Never Used   • Tobacco comment: started at 14/ 1 cig per day when he did smoke   Substance Use Topics   • Alcohol use: Yes     Alcohol/week: 0.6 oz     Types: 1 Cans of beer per week     Comment: rarely   • Drug use: No     "  Social History     Substance and Sexual Activity   Drug Use No       FAMILY HISTORY  Family History   Problem Relation Age of Onset   • Hypertension Mother    • No Known Problems Father    • Genetic Disorder Paternal Grandfather        CURRENT MEDICATIONS  Home Medications     Reviewed by Jett Harp (Pharmacy Tech) on 07/25/20 at 1515  Med List Status: Partial   Medication Last Dose Status   azithromycin (ZITHROMAX) 250 MG Tab UNK Active   fluticasone (FLONASE) 50 MCG/ACT nasal spray UNK Active   terazosin (HYTRIN) 5 MG Cap UNK Active                ALLERGIES  No Known Allergies    PHYSICAL EXAM  VITAL SIGNS: /68   Pulse 83   Temp 37.4 °C (99.3 °F) (Temporal)   Resp (!) 22   Ht 1.676 m (5' 6\")   Wt 61.2 kg (135 lb)   SpO2 96%   BMI 21.79 kg/m² oximetry is 96% on room air is interpreted as non-hypoxic    Constitutional: Elderly in appearance but otherwise well developed, Well nourished, No acute distress, Non-toxic appearance.   HENT: Normocephalic, Atraumatic, Bilateral external ears normal, oropharynx moist, No oral exudates, DMM   Eyes:conjunctiva is normal, there are no signs of exudate.   Neck: Supple, no meningeal signs.  Lymphatic: No lymphadenopathy noted.   Cardiovascular: Regular rate and rhythm without murmurs gallops or rubs.   Thorax & Lungs: No respiratory distress. Breathing comfortably. Lungs diminished with mild crackles at the bases, there are no wheezes. Chest wall is nontender.  Abdomen: Soft, nontender, nondistended. Bowel sounds are present.   Skin: Warm, Dry, No erythema,   Back: No tenderness, No CVA tenderness.  Musculoskeletal: Good range of motion in all major joints. No tenderness to palpation or major deformities noted. Intact distal pulses, no clubbing, no cyanosis, no edema, negative homans sign,  Neurologic: Alert & oriented x 3, Moving all extremities. No gross abnormalities.    Psychiatric: Affect normal, Judgment normal, Mood normal.     LABS  Results for " orders placed or performed during the hospital encounter of 07/25/20   CBC w/ Differential   Result Value Ref Range    WBC 5.4 4.8 - 10.8 K/uL    RBC 2.47 (L) 4.70 - 6.10 M/uL    Hemoglobin 7.6 (L) 14.0 - 18.0 g/dL    Hematocrit 23.6 (L) 42.0 - 52.0 %    MCV 95.5 81.4 - 97.8 fL    MCH 30.8 27.0 - 33.0 pg    MCHC 32.2 (L) 33.7 - 35.3 g/dL    RDW 47.2 35.9 - 50.0 fL    Platelet Count 367 164 - 446 K/uL    MPV 9.3 9.0 - 12.9 fL    Neutrophils-Polys 82.30 (H) 44.00 - 72.00 %    Lymphocytes 10.00 (L) 22.00 - 41.00 %    Monocytes 6.70 0.00 - 13.40 %    Eosinophils 0.20 0.00 - 6.90 %    Basophils 0.20 0.00 - 1.80 %    Immature Granulocytes 0.60 0.00 - 0.90 %    Nucleated RBC 0.00 /100 WBC    Neutrophils (Absolute) 4.46 1.82 - 7.42 K/uL    Lymphs (Absolute) 0.54 (L) 1.00 - 4.80 K/uL    Monos (Absolute) 0.36 0.00 - 0.85 K/uL    Eos (Absolute) 0.01 0.00 - 0.51 K/uL    Baso (Absolute) 0.01 0.00 - 0.12 K/uL    Immature Granulocytes (abs) 0.03 0.00 - 0.11 K/uL    NRBC (Absolute) 0.00 K/uL   Complete Metabolic Panel (CMP)   Result Value Ref Range    Sodium 138 135 - 145 mmol/L    Potassium 4.2 3.6 - 5.5 mmol/L    Chloride 104 96 - 112 mmol/L    Co2 18 (L) 20 - 33 mmol/L    Anion Gap 16.0 7.0 - 16.0    Glucose 120 (H) 65 - 99 mg/dL    Bun 18 8 - 22 mg/dL    Creatinine 1.52 (H) 0.50 - 1.40 mg/dL    Calcium 8.5 8.5 - 10.5 mg/dL    AST(SGOT) 31 12 - 45 U/L    ALT(SGPT) 34 2 - 50 U/L    Alkaline Phosphatase 75 30 - 99 U/L    Total Bilirubin 0.5 0.1 - 1.5 mg/dL    Albumin 3.3 3.2 - 4.9 g/dL    Total Protein 6.2 6.0 - 8.2 g/dL    Globulin 2.9 1.9 - 3.5 g/dL    A-G Ratio 1.1 g/dL   Troponin STAT   Result Value Ref Range    Troponin T 28 (H) 6 - 19 ng/L   proBrain Natriuretic Peptide, NT   Result Value Ref Range    NT-proBNP 216 (H) 0 - 125 pg/mL   LACTIC ACID   Result Value Ref Range    Lactic Acid 1.8 0.5 - 2.0 mmol/L   ESTIMATED GFR   Result Value Ref Range    GFR If  54 (A) >60 mL/min/1.73 m 2    GFR If Non African  American 45 (A) >60 mL/min/1.73 m 2   Blood Culture,Hold   Result Value Ref Range    Blood Culture Hold Collected    EKG   Result Value Ref Range    Report       [facility] Emergency Dept.    Test Date:  2020  Pt Name:    TITO LEWIS              Department: ER  MRN:        [id number]                      Room:       [facility]  Gender:     Male                         Technician: [id number]  :        1945                   Requested By:ER TRIAGE PROTOCOL  Order #:    [id number]                    Reading MD: VIRAL PINEDA MD    Measurements  Intervals                                Axis  Rate:       84                           P:  TN:                                      QRS:        75  QRSD:       82                           T:          46  QT:         364  QTc:        431    Interpretive Statements  SINUS RHYTHM  No previous ECG available for comparison  no acute findings  Electronically Signed On 2020 15:18:43 PDT by VIRAL PINEDA MD       All labs reviewed by me.    EKG  Interpreted by me as above.    RADIOLOGY  CT-CTA CHEST PULMONARY ARTERY W/ RECONS   Final Result      1.  No CT evidence for pulmonary emboli.   2.  Patchy bilateral groundglass opacities in the periphery of both lungs, consistent with multifocal pneumonia/pneumonitis.  These findings are compatible with comminuted 19 pneumonia.   3.  Ascending aortic aneurysm measuring 4.3 cm in diameter.   4.  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  No expansile lesion demonstrated.               DX-CHEST-PORTABLE (1 VIEW)   Final Result      1.  Minimal LEFT lung base atelectasis or scar.   2.  Hazy opacity in the peripheral LEFT midlung, most likely expansile lesion involving LEFT lateral 5th rib.  Pneumonia is felt less likely.        The radiologist's interpretation of all radiological studies have been reviewed by me.    COURSE & MEDICAL DECISION MAKING  Pertinent Labs &  Imaging studies reviewed. (See chart for details)    1:51 PM - Patient seen and examined at bedside.  I discussed that we will obtain labs and imaging to further evaluate for a cause of his symptoms. Pro-BNP ordered to evaluate for heart failure that could be causing his shortness of breath. Ordered chest x-ray, CTA chest, lactic acid, CBC w/ diff, CMP, troponin, and pro-BNP to evaluate his symptoms. The differential diagnoses include but are not limited to: pneumonia, worsening COVID vs PE    Decision Making:  Patient presents for evaluation.  Clinically the patient does have diminished breath sounds.  Reviewing his last hospital stay he did have a significant prerenal azotemia with an elevated creatinine of 1.8.  Because of his describing a worsening symptoms and with the known COVID infections very possible the patient may have had a pulmonary embolism so I did do initially chest x-ray I did give the patient a liter bolus of lactated Ringer's and his creatinine from the initial blood test for 1.5 with a GFR 40 so at this point we could do a CT scan of the chest.  I did do the fluid bolus to help with renal protectivity did a CT scan which has the above findings.  At this point there is no signs of pulmonary embolism there are the patchy groundglass infiltrates as described above which is most likely secondary to the COVID infection.  Because of continued symptoms and worsening I will start the patient on Medrol Dosepak with Zithromax.  The patient is to follow with primary care physician 1 week for recheck should return if any symptoms worsen.     The patient will return for new or worsening symptoms and is stable at the time of discharge.    The patient is referred to a primary physician for blood pressure management, diabetic screening, and for all other preventative health concerns.        DISPOSITION:  Patient will be discharged home in stable condition.    FOLLOW UP:  Jose Maher M.D.  [address] [place] [address]  [facility] [address]  [place] [address]  [phone]    Schedule an appointment as soon as possible for a visit in 1 week  For re-check, Return if any symptoms worsen      OUTPATIENT MEDICATIONS:  New Prescriptions    AZITHROMYCIN (ZITHROMAX Z-CHRISTINE) 250 MG TAB    Take 1 Tab by mouth every day for 5 days. Dispense a z-pack use as directed    METHYLPREDNISOLONE (MEDROL DOSEPAK) 4 MG TABLET THERAPY PACK    Please dispense a Medrol Dosepak with instructions, use as directed          FINAL IMPRESSION  1. Shortness of breath    2. COVID-19 virus detected          Manisha LU (Scribe), am scribing for, and in the presence of, Christiano Connolly M.D..    During patient evaluation, Manisha LU, was not within 6 feet of patient at any time.    Electronically signed by: Manisha Sanchez (Scribe), 7/25/2020    Christiano LU M.D. personally performed the services described in this documentation, as scribed by Manisha Sanchez in my presence, and it is both accurate and complete. C    The note accurately reflects work and decisions made by me.  Christiano Connolly M.D.  7/25/2020  3:23 PM

## 2020-07-25 NOTE — ED TRIAGE NOTES
"Parker Kern presents via [facility], wearing a mask, reporting:  Chief Complaint   Patient presents with   • Shortness of Breath     BIB [facility] from home for SOB with exertion. Pt diagnosed Covid positive on 7/19.      /68   Pulse 83   Temp 37.4 °C (99.3 °F) (Temporal)   Resp (!) 22   Ht 1.676 m (5' 6\")   Wt 61.2 kg (135 lb)   SpO2 96%   BMI 21.79 kg/m²       Pt assessed, educated on plan for care, placed in [facility], on monitor.       EKG done.   "

## 2020-07-25 NOTE — ED NOTES
Pt clear for d/c. Educated on d/c instructions, verbalized understanding.   Pt does not have car to drive home. Attempted to call son for ride home. Son refusing to  patient and drive him home.

## 2020-07-25 NOTE — ED NOTES
Pt ambulatory around room, no SOB and lowest o2 sat was 92% on room air. ERP aware. LR continues infusing.

## 2020-07-26 NOTE — DISCHARGE PLANNING
Medical Social Work    MADALYNW was notified that pt is Covid+ and pt's son is refusing to  the pt and take him home. Pt requesting a taxi ride home. MADALYNW spoke w/ [employer] regarding pt's desire to take a cab ride home. MADALYNW was informed that the only transportation options for Covid+ patients are to send them home via [facility] or [facility]. [facility] is not operating right now so pt would have to go via [facility]. RONALD spoke w/ Penelope at [facility] who states that [facility] will submit the transport request to pt's insurance for payment and it is up the insurance if they will pay for the trip or not. Pt and pt's family will need to be notified that pt may be billed for the [facility] ride.     RONALD updated bedside RN who says she will speak w/ the pt and pt's son.

## 2020-08-14 RX ORDER — TERAZOSIN 5 MG/1
CAPSULE ORAL
Qty: 90 CAP | Refills: 2 | Status: SHIPPED | OUTPATIENT
Start: 2020-08-14

## 2021-01-11 DIAGNOSIS — Z23 NEED FOR VACCINATION: ICD-10-CM

## 2021-04-06 RX ORDER — FLUTICASONE PROPIONATE 50 MCG
1 SPRAY, SUSPENSION (ML) NASAL
Qty: 16 G | Refills: 0 | OUTPATIENT
Start: 2021-04-06

## 2025-01-22 ENCOUNTER — OFFICE VISIT (OUTPATIENT)
Dept: MEDICAL GROUP | Facility: CLINIC | Age: 80
End: 2025-01-22
Payer: COMMERCIAL

## 2025-01-22 VITALS
HEIGHT: 66 IN | SYSTOLIC BLOOD PRESSURE: 142 MMHG | HEART RATE: 82 BPM | OXYGEN SATURATION: 96 % | WEIGHT: 122.8 LBS | DIASTOLIC BLOOD PRESSURE: 70 MMHG | TEMPERATURE: 97.6 F | BODY MASS INDEX: 19.73 KG/M2

## 2025-01-22 DIAGNOSIS — R73.03 PREDIABETES: ICD-10-CM

## 2025-01-22 DIAGNOSIS — R35.1 NOCTURIA: ICD-10-CM

## 2025-01-22 DIAGNOSIS — R35.1 URINATION, EXCESSIVE AT NIGHT: Primary | ICD-10-CM

## 2025-01-22 DIAGNOSIS — Z13.228 SCREENING FOR METABOLIC DISORDER: ICD-10-CM

## 2025-01-22 DIAGNOSIS — Z80.42 FAMILY HX OF PROSTATE CANCER: ICD-10-CM

## 2025-01-22 PROCEDURE — 3078F DIAST BP <80 MM HG: CPT

## 2025-01-22 PROCEDURE — 3077F SYST BP >= 140 MM HG: CPT

## 2025-01-22 PROCEDURE — 99204 OFFICE O/P NEW MOD 45 MIN: CPT

## 2025-01-22 RX ORDER — TAMSULOSIN HYDROCHLORIDE 0.4 MG/1
0.4 CAPSULE ORAL
Qty: 30 CAPSULE | Refills: 5 | Status: SHIPPED | OUTPATIENT
Start: 2025-01-22

## 2025-01-22 ASSESSMENT — PATIENT HEALTH QUESTIONNAIRE - PHQ9: CLINICAL INTERPRETATION OF PHQ2 SCORE: 0

## 2025-01-22 NOTE — PROGRESS NOTES
[facility] MEDICINE     PATIENT ID:  NAME:  Parker Kern  MRN:               [id number]  YOB: 1945    Date: 1:49 PM      Fellow: Aron Mckay M.D.    CC: Establish with a new provider      HPI: Parker Kern is a 79 y.o. male who presented with his son to establish with a new provider.  The patient states that overall he feels he is doing well but does have nightly episodes of nocturia.  He states that he usually urinates 3 times per night and this has been going on for several months.  He states that he has not seen a primary care provider in quite some time so he felt it was time to get this evaluated.  Otherwise he states he is at his baseline state of health and has no other concerns.  He denies any recent fevers, chills, chest pain, shortness of breath, abdominal pain, nausea, vomiting, headache, lightheadedness, dizziness, weakness, lateralizing symptoms, trauma, injury or falls.  He states that he does not take any medications other than occasional aspirin.      No problems updated.    REVIEW OF SYSTEMS:   Ten systems reviewed and were negative except as noted in the HPI.                PROBLEM LIST  Patient Active Problem List   Diagnosis    HTN (hypertension), benign    Benign prostatic hyperplasia with urinary retention    Gout, arthritis    AR (allergic rhinitis)    Cataract, right eye    Renal insufficiency    IGT (impaired glucose tolerance)        PAST SURGICAL HISTORY:  Past Surgical History:   Procedure Laterality Date    CATARACT PHACO WITH IOL  9/9/2014    Performed by Nithin Morrow M.D. at [facility]       FAMILY HISTORY:  Family History   Problem Relation Age of Onset    Hypertension Mother     No Known Problems Father     Genetic Disorder Paternal Grandfather        SOCIAL HISTORY:   Social History     Tobacco Use    Smoking status: Former     Current packs/day: 0.00     Average packs/day: 0.1 packs/day for 36.0 years (3.6 ttl pk-yrs)     Types:  "Cigarettes     Start date: 1959     Quit date: 1995     Years since quittin.0    Smokeless tobacco: Never    Tobacco comments:     started at 14/ 1 cig per day when he did smoke   Substance Use Topics    Alcohol use: Yes     Alcohol/week: 0.6 oz     Types: 1 Cans of beer per week     Comment: rarely       ALLERGIES:  No Known Allergies    OUTPATIENT MEDICATIONS:    Current Outpatient Medications:     tamsulosin (FLOMAX) 0.4 MG capsule, Take 1 Capsule by mouth 1/2 hour after breakfast., Disp: 30 Capsule, Rfl: 5    PHYSICAL EXAM:  Vitals:    25 1322   BP: (!) 142/70   BP Location: Right arm   Patient Position: Sitting   BP Cuff Size: Adult   Pulse: 82   Temp: 36.4 °C (97.6 °F)   SpO2: 96%   Weight: 55.7 kg (122 lb 12.8 oz)   Height: 1.676 m (5' 6\")       General: Pt resting in NAD, pleasant and cooperative   Skin:  Pink, warm and dry.  HEENT: NC/AT. EOMI. PERRLA, no anterior neck mass or lymphadenopathy  Lungs:  Symmetrical.  CTAB, good air movement, no adventitious sounds  Cardiovascular:  S1/S2 RRR, no murmurs rubs or gallops  Abdomen:  Abdomen is soft, nontender, no distention, no peritoneal sign  Extremities:  Full range of motion.  CNS:  Muscle tone is normal. No gross focal neurologic deficits      ASSESSMENT/PLAN:   79 y.o. male who presents to clinic to establish with a new provider.  Patient notes that his only concern is 2-3 episodes of nocturia per night which is slightly disrupting his sleep.  He states that he has a friend that is on a medication for this, tamsulosin, which has been very helpful for him and he was wondering if he could try the medicine.  Counseled patient on causes of nocturia including BPH, prostate cancer, overhydration before bed, as well as causes of polyuria such as diabetes as he was found to be prediabetic several years ago and seemed to be unaware of this.  At this time we will do baseline metabolic screening blood work including CMP, TSH, lipid profile, " microalbumin to creatinine ratio, urinalysis.  Counseled patient on prostate cancer screening and we will order for PSA at this time as patient's father does have a history of prostate cancer.  At this time we will treat with tamsulosin nightly.  Otherwise we will plan to follow-up in 1 month as patient does have an elevated blood pressure reading in clinic today.  He states he does have a blood pressure monitor at home, counseled patient on ambulatory monitoring at home and to bring a blood pressure log to next visit.  Also counseled patient on red flag symptoms and hypertensive urgency/emergency and when he should be seen in the emergency department if blood pressures are very elevated.  Patient states he understands and agrees with this course.        Problem List Items Addressed This Visit    None  Visit Diagnoses       Urination, excessive at night    -  Primary    Relevant Medications    tamsulosin (FLOMAX) 0.4 MG capsule    Other Relevant Orders    POCT Urinalysis    URINALYSIS    Nocturia        Relevant Medications    tamsulosin (FLOMAX) 0.4 MG capsule    Other Relevant Orders    PROSTATE SPECIFIC AG SCREENING    URINALYSIS    Prediabetes        Relevant Orders    Comp Metabolic Panel    Lipid Profile    MICROALBUMIN CREAT RATIO URINE    TSH WITH REFLEX TO FT4    Screening for metabolic disorder        Relevant Orders    Comp Metabolic Panel    Lipid Profile    TSH WITH REFLEX TO FT4    Family hx of prostate cancer        Relevant Orders    PROSTATE SPECIFIC AG SCREENING            Aron Mckay M.D.  PGY-4, Wilderness Fellow  [facility]

## 2025-02-06 ENCOUNTER — HOSPITAL ENCOUNTER (OUTPATIENT)
Facility: MEDICAL CENTER | Age: 80
End: 2025-02-06
Payer: COMMERCIAL

## 2025-02-06 DIAGNOSIS — Z80.42 FAMILY HX OF PROSTATE CANCER: ICD-10-CM

## 2025-02-06 DIAGNOSIS — Z13.228 SCREENING FOR METABOLIC DISORDER: ICD-10-CM

## 2025-02-06 DIAGNOSIS — R35.1 NOCTURIA: ICD-10-CM

## 2025-02-06 DIAGNOSIS — R73.03 PREDIABETES: ICD-10-CM

## 2025-02-06 DIAGNOSIS — R35.1 URINATION, EXCESSIVE AT NIGHT: ICD-10-CM

## 2025-02-06 LAB
APPEARANCE UR: CLEAR
BACTERIA #/AREA URNS HPF: ABNORMAL /HPF
BILIRUB UR QL STRIP.AUTO: NEGATIVE
CASTS URNS QL MICRO: ABNORMAL /LPF (ref 0–2)
COLOR UR: YELLOW
CREAT UR-MCNC: 188 MG/DL
EPITHELIAL CELLS 1715: ABNORMAL /HPF (ref 0–5)
GLUCOSE UR STRIP.AUTO-MCNC: NEGATIVE MG/DL
KETONES UR STRIP.AUTO-MCNC: NEGATIVE MG/DL
LEUKOCYTE ESTERASE UR QL STRIP.AUTO: ABNORMAL
MICRO URNS: ABNORMAL
MICROALBUMIN UR-MCNC: 3 MG/DL
MICROALBUMIN/CREAT UR: 16 MG/G (ref 0–30)
NITRITE UR QL STRIP.AUTO: NEGATIVE
PH UR STRIP.AUTO: 5.5 [PH] (ref 5–8)
PROT UR QL STRIP: NEGATIVE MG/DL
RBC # URNS HPF: ABNORMAL /HPF (ref 0–2)
RBC UR QL AUTO: NEGATIVE
SP GR UR STRIP.AUTO: 1.02
UROBILINOGEN UR STRIP.AUTO-MCNC: 1 EU/DL
WBC #/AREA URNS HPF: ABNORMAL /HPF

## 2025-02-06 PROCEDURE — 84153 ASSAY OF PSA TOTAL: CPT

## 2025-02-06 PROCEDURE — 84439 ASSAY OF FREE THYROXINE: CPT

## 2025-02-06 PROCEDURE — 84443 ASSAY THYROID STIM HORMONE: CPT

## 2025-02-06 PROCEDURE — 80053 COMPREHEN METABOLIC PANEL: CPT

## 2025-02-06 PROCEDURE — 82043 UR ALBUMIN QUANTITATIVE: CPT

## 2025-02-06 PROCEDURE — 36415 COLL VENOUS BLD VENIPUNCTURE: CPT

## 2025-02-06 PROCEDURE — 81001 URINALYSIS AUTO W/SCOPE: CPT

## 2025-02-06 PROCEDURE — 80061 LIPID PANEL: CPT

## 2025-02-06 PROCEDURE — 82570 ASSAY OF URINE CREATININE: CPT

## 2025-02-07 LAB
ALBUMIN SERPL BCP-MCNC: 4.3 G/DL (ref 3.2–4.9)
ALBUMIN/GLOB SERPL: 1.5 G/DL
ALP SERPL-CCNC: 70 U/L (ref 30–99)
ALT SERPL-CCNC: 15 U/L (ref 2–50)
ANION GAP SERPL CALC-SCNC: 11 MMOL/L (ref 7–16)
AST SERPL-CCNC: 21 U/L (ref 12–45)
BILIRUB SERPL-MCNC: 0.5 MG/DL (ref 0.1–1.5)
BUN SERPL-MCNC: 27 MG/DL (ref 8–22)
CALCIUM ALBUM COR SERPL-MCNC: 9.1 MG/DL (ref 8.5–10.5)
CALCIUM SERPL-MCNC: 9.3 MG/DL (ref 8.5–10.5)
CHLORIDE SERPL-SCNC: 108 MMOL/L (ref 96–112)
CHOLEST SERPL-MCNC: 176 MG/DL (ref 100–199)
CO2 SERPL-SCNC: 23 MMOL/L (ref 20–33)
CREAT SERPL-MCNC: 1.49 MG/DL (ref 0.5–1.4)
GFR SERPLBLD CREATININE-BSD FMLA CKD-EPI: 47 ML/MIN/1.73 M 2
GLOBULIN SER CALC-MCNC: 2.9 G/DL (ref 1.9–3.5)
GLUCOSE SERPL-MCNC: 99 MG/DL (ref 65–99)
HDLC SERPL-MCNC: 64 MG/DL
LDLC SERPL CALC-MCNC: 98 MG/DL
POTASSIUM SERPL-SCNC: 4.1 MMOL/L (ref 3.6–5.5)
PROT SERPL-MCNC: 7.2 G/DL (ref 6–8.2)
PSA SERPL DL<=0.01 NG/ML-MCNC: 2.84 NG/ML (ref 0–4)
SODIUM SERPL-SCNC: 142 MMOL/L (ref 135–145)
T4 FREE SERPL-MCNC: 1 NG/DL (ref 0.93–1.7)
TRIGL SERPL-MCNC: 72 MG/DL (ref 0–149)
TSH SERPL DL<=0.005 MIU/L-ACNC: 7.17 UIU/ML (ref 0.38–5.33)

## 2025-02-12 ENCOUNTER — OFFICE VISIT (OUTPATIENT)
Dept: MEDICAL GROUP | Facility: CLINIC | Age: 80
End: 2025-02-12
Payer: COMMERCIAL

## 2025-02-12 VITALS
HEIGHT: 68 IN | TEMPERATURE: 97.3 F | OXYGEN SATURATION: 90 % | BODY MASS INDEX: 18.49 KG/M2 | DIASTOLIC BLOOD PRESSURE: 85 MMHG | WEIGHT: 122 LBS | SYSTOLIC BLOOD PRESSURE: 165 MMHG | HEART RATE: 79 BPM

## 2025-02-12 DIAGNOSIS — N18.31 STAGE 3A CHRONIC KIDNEY DISEASE: ICD-10-CM

## 2025-02-12 DIAGNOSIS — I10 PRIMARY HYPERTENSION: ICD-10-CM

## 2025-02-12 DIAGNOSIS — E03.8 SUBCLINICAL HYPOTHYROIDISM: ICD-10-CM

## 2025-02-12 PROCEDURE — 3077F SYST BP >= 140 MM HG: CPT

## 2025-02-12 PROCEDURE — 3079F DIAST BP 80-89 MM HG: CPT

## 2025-02-12 PROCEDURE — 99214 OFFICE O/P EST MOD 30 MIN: CPT

## 2025-02-12 RX ORDER — LEVOTHYROXINE SODIUM 25 UG/1
25 TABLET ORAL
Qty: 30 TABLET | Refills: 2 | Status: SHIPPED | OUTPATIENT
Start: 2025-02-12 | End: 2025-05-13

## 2025-02-12 RX ORDER — AMLODIPINE AND OLMESARTAN MEDOXOMIL 5; 20 MG/1; MG/1
1 TABLET ORAL DAILY
Qty: 100 TABLET | Refills: 3 | Status: SHIPPED | OUTPATIENT
Start: 2025-02-12 | End: 2025-03-14

## 2025-02-13 NOTE — PROGRESS NOTES
[facility] MEDICINE     PATIENT ID:  NAME:  Parker Kern  MRN:               [id number]  YOB: 1945    Date: 1:11 PM      Fellow: Aron Mckay M.D.    CC:  Follow up       HPI: Parker Kern is a 79 y.o. male who presented for follow-up on lab work.  Patient notes otherwise he is at his baseline state of health and has no other concerns.  He notes he has been taking tamsulosin as prescribed and does note that he seems to have some improvement in his episodes of nocturia.  Otherwise patient denies any recent fevers, chills, chest pain, shortness of breath, abdominal pain, nausea, vomiting, headache, lightheadedness, dizziness, weakness, lateralizing symptoms, trauma, injury or falls.       No problems updated.    REVIEW OF SYSTEMS:   Ten systems reviewed and were negative except as noted in the HPI.                PROBLEM LIST  Patient Active Problem List   Diagnosis    HTN (hypertension), benign    Benign prostatic hyperplasia with urinary retention    Gout, arthritis    AR (allergic rhinitis)    Cataract, right eye    Renal insufficiency    IGT (impaired glucose tolerance)        PAST SURGICAL HISTORY:  Past Surgical History:   Procedure Laterality Date    CATARACT PHACO WITH IOL  2014    Performed by Nithin Morrow M.D. at [facility]       FAMILY HISTORY:  Family History   Problem Relation Age of Onset    Hypertension Mother     No Known Problems Father     Genetic Disorder Paternal Grandfather        SOCIAL HISTORY:   Social History     Tobacco Use    Smoking status: Former     Current packs/day: 0.00     Average packs/day: 0.1 packs/day for 36.0 years (3.6 ttl pk-yrs)     Types: Cigarettes     Start date: 1959     Quit date: 1995     Years since quittin.1    Smokeless tobacco: Never    Tobacco comments:     started at 14/ 1 cig per day when he did smoke   Substance Use Topics    Alcohol use: Yes     Alcohol/week: 0.6 oz     Types: 1 Cans of beer per  "week     Comment: rarely       ALLERGIES:  No Known Allergies    OUTPATIENT MEDICATIONS:    Current Outpatient Medications:     amLODIPine-Olmesartan 5-20 MG Tab, Take 1 Each by mouth every day for 30 days., Disp: 100 Tablet, Rfl: 3    levothyroxine (SYNTHROID) 25 MCG Tab, Take 1 Tablet by mouth every morning on an empty stomach for 90 days., Disp: 30 Tablet, Rfl: 2    tamsulosin (FLOMAX) 0.4 MG capsule, Take 1 Capsule by mouth 1/2 hour after breakfast. (Patient not taking: Reported on 2/12/2025), Disp: 30 Capsule, Rfl: 5    PHYSICAL EXAM:  Vitals:    02/12/25 1101   BP: (!) 165/85   BP Location: Left arm   Patient Position: Sitting   BP Cuff Size: Adult   Pulse: 79   Temp: 36.3 °C (97.3 °F)   TempSrc: Temporal   SpO2: 90%   Weight: 55.3 kg (122 lb)   Height: 1.727 m (5' 8\")       General: Pt resting in NAD, pleasant and cooperative   Skin:  Pink, warm and dry.  HEENT: NC/AT. EOMI. PERRLA, No anterior neck mass or lymphadenopathy  Lungs:  Symmetrical.  CTAB, good air movement, no adventitious sounds   Cardiovascular:  S1/S2 RRR, no murmurs rubs or gallops   Abdomen:  Abdomen is soft, nontender, no distention  Extremities:  Full range of motion.  CNS:  Muscle tone is normal. No gross focal neurologic deficits      ASSESSMENT/PLAN:   79 y.o. male who presents to clinic for lab follow up, otherwise patient states that he is at his baseline state of health and has no other concerns.    1. Subclinical hypothyroidism  Recent lab work does show a TSH of 7.17 and a free T4 of 1.00 and patient notes that he does occasionally have fatigue or low energy level.  Counseled patient on subclinical hypothyroidism.  At this time we will plan to start Synthroid 25 mcg daily, counseled patient on how to take this medication in the morning before any food or drink and to avoid any food or drink for 2 hours after taking medicine.  Will repeat TSH, free T4 and free T3 in 4-6 weeks to evaluate response to therapy.  Will plan to follow-up " at that time to see if symptoms have improved.  - levothyroxine (SYNTHROID) 25 MCG Tab; Take 1 Tablet by mouth every morning on an empty stomach for 90 days.  Dispense: 30 Tablet; Refill: 2  - TSH+FREE T4  - T3 FREE; Future    2. Primary hypertension  Patient's blood pressure elevated in clinic today 165/85.  He states he did do ambulatory blood pressure monitoring at home and has been running between 130-150/80-90.  At this time I counseled patient I would recommend to start a medication for his blood pressure.  He states that he is amenable to this.  Will start amlodipine-olmesartan 5-20 mg daily and continue home blood pressure monitoring.  Advised him that we will follow-up in 4 to 6 weeks depending on when he gets his blood work done and we will see how his blood pressures have been running on medication at home and make any adjustments needed at that time.  Counseled patient on hypertensive urgency and when to seek an emergent evaluation in the emergency department or when to return to clinic for blood pressure medicine adjustment.  - amLODIPine-Olmesartan 5-20 MG Tab; Take 1 Each by mouth every day for 30 days.  Dispense: 100 Tablet; Refill: 3    3. Stage 3a chronic kidney disease  Patient has a longstanding history of CKD with GFR is ranging from 37-55 over the past 12 years.  During this time perior his kidney function appears to be stable and recent microalbumin to creatinine testing shows no evidence of protein spillage in the urine.  Counseled patient on his kidney health and at this time he would like to establish with a nephrologist.  Likely hypertensive nephropathy in the past, will treat hypertension as well until he is able to establish with nephrology.  - amLODIPine-Olmesartan 5-20 MG Tab; Take 1 Each by mouth every day for 30 days.  Dispense: 100 Tablet; Refill: 3  - Referral to Nephrology      Problem List Items Addressed This Visit    None  Visit Diagnoses         Subclinical hypothyroidism         Relevant Medications    levothyroxine (SYNTHROID) 25 MCG Tab    Other Relevant Orders    TSH+FREE T4    T3 FREE      Primary hypertension        Relevant Medications    amLODIPine-Olmesartan 5-20 MG Tab      Stage 3a chronic kidney disease        Relevant Medications    amLODIPine-Olmesartan 5-20 MG Tab    Other Relevant Orders    Referral to Nephrology            Aron Mckay M.D.  PGY-4, Wilderness Fellow  [facility]

## 2025-02-18 NOTE — Clinical Note
REFERRAL APPROVAL NOTICE         Sent on February 18, 2025                   Parker Kern  [address]  [place] [address]                   Dear Mr. Kern,    After a careful review of the medical information and benefit coverage, [facility] has processed your referral. See below for additional details.    If applicable, you must be actively enrolled with your insurance for coverage of the authorized service. If you have any questions regarding your coverage, please contact your insurance directly.    REFERRAL INFORMATION   Referral #:  [id number]  Referred-To Department    Referred-By Provider:  Nephrology    Arno Mckay M.D.   [facility]      [address]  [place] [address]  [phone] [address]  [place] [id number] [phone]    Referral Start Date:  02/12/2025  Referral End Date:   02/12/2026             SCHEDULING  If you do not already have an appointment, please call [phone] to make an appointment.     MORE INFORMATION  If you do not already have a [employer] account, sign up at: [email or web address]  You can access your medical information, make appointments, see lab results, billing information, and more.  If you have questions regarding this referral, please contact  the [facility] Referrals department at:             [phone]. Monday - Friday 8:00AM - 5:00PM.     Sincerely,    [facility]

## 2025-02-25 ENCOUNTER — OFFICE VISIT (OUTPATIENT)
Dept: NEPHROLOGY | Facility: MEDICAL CENTER | Age: 80
End: 2025-02-25
Payer: COMMERCIAL

## 2025-02-25 VITALS
WEIGHT: 125.8 LBS | DIASTOLIC BLOOD PRESSURE: 64 MMHG | BODY MASS INDEX: 20.22 KG/M2 | TEMPERATURE: 98.1 F | OXYGEN SATURATION: 96 % | HEIGHT: 66 IN | HEART RATE: 84 BPM | SYSTOLIC BLOOD PRESSURE: 110 MMHG

## 2025-02-25 DIAGNOSIS — E55.9 VITAMIN D DEFICIENCY: ICD-10-CM

## 2025-02-25 DIAGNOSIS — R33.8 BENIGN PROSTATIC HYPERPLASIA WITH URINARY RETENTION: ICD-10-CM

## 2025-02-25 DIAGNOSIS — I10 HTN (HYPERTENSION), BENIGN: ICD-10-CM

## 2025-02-25 DIAGNOSIS — Z87.440 HISTORY OF UTI: ICD-10-CM

## 2025-02-25 DIAGNOSIS — D64.9 ANEMIA, UNSPECIFIED TYPE: ICD-10-CM

## 2025-02-25 DIAGNOSIS — N40.1 BENIGN PROSTATIC HYPERPLASIA WITH URINARY RETENTION: ICD-10-CM

## 2025-02-25 DIAGNOSIS — M10.9 GOUT, ARTHRITIS: ICD-10-CM

## 2025-02-25 DIAGNOSIS — N18.31 STAGE 3A CHRONIC KIDNEY DISEASE: ICD-10-CM

## 2025-02-25 ASSESSMENT — ENCOUNTER SYMPTOMS
SHORTNESS OF BREATH: 0
FEVER: 0
ABDOMINAL PAIN: 0

## 2025-02-25 NOTE — PROGRESS NOTES
Chief Complaint   Patient presents with    Chronic Kidney Disease     CC: my kidney labs were abnormal  Requesting Provider: Aron Mckay M.D.    HPI:  Parker Kern is a 79 y.o. male with a history of hypertension, BPH, gout, CKD 3a who presents today to establish nephrology care.     Re: HTN, diagnosed around 2009. He was put on BP medications around 2009, and took his meds, but stopped taking medication around 2020 or 2021 when he stopped working and lost his insurance. BP control over the years has been good when he was taking medication. He checks BP at home, usually 140 / 75.     Re: BPH. He has 2x nocturia. He feels like his urine flow is better on tamsulosin.     Re: CKD. He doesn't know if he was born full term. He never had diabetes. He denies history of JULIETA, kidney stone. He takes naproxen every day for left knee pain.     Re: Gout. He thinks he had it in the [facility]. He gets pain in his fingers sometimes.       Past Medical History:   Diagnosis Date    Allergic rhinitis     BPH     Chronic allergic rhinitis     COPD     Glucose intolerance (impaired glucose tolerance)     HTN        Past Surgical History:   Procedure Laterality Date    CATARACT PHACO WITH IOL  9/9/2014    Performed by Nithin Morrow M.D. at [facility]        Outpatient Encounter Medications as of 2/25/2025   Medication Sig Dispense Refill    amLODIPine-Olmesartan 5-20 MG Tab Take 1 Each by mouth every day for 30 days. 100 Tablet 3    levothyroxine (SYNTHROID) 25 MCG Tab Take 1 Tablet by mouth every morning on an empty stomach for 90 days. 30 Tablet 2    tamsulosin (FLOMAX) 0.4 MG capsule Take 1 Capsule by mouth 1/2 hour after breakfast. 30 Capsule 5     No facility-administered encounter medications on file as of 2/25/2025.        No Known Allergies    Social History     Socioeconomic History    Marital status:      Spouse name: Not on file    Number of children: Not on file    Years of education: Not  "on file    Highest education level: Not on file   Occupational History    Not on file   Tobacco Use    Smoking status: Former     Current packs/day: 0.00     Average packs/day: 0.1 packs/day for 36.0 years (3.6 ttl pk-yrs)     Types: Cigarettes     Start date: 1959     Quit date: 1995     Years since quittin.1    Smokeless tobacco: Never    Tobacco comments:     started at 14/ 1 cig per day when he did smoke   Substance and Sexual Activity    Alcohol use: Yes     Alcohol/week: 0.6 oz     Types: 1 Cans of beer per week     Comment: rarely    Drug use: No    Sexual activity: Never     Partners: Female   Other Topics Concern    Not on file   Social History Narrative    Not on file     Social Drivers of Health     Financial Resource Strain: Not on file   Food Insecurity: Not on file   Transportation Needs: Not on file   Physical Activity: Not on file   Stress: Not on file   Social Connections: Not on file   Intimate Partner Violence: Not on file   Housing Stability: Not on file       Family History   Problem Relation Age of Onset    Hypertension Mother     No Known Problems Father     Genetic Disorder Paternal Grandfather     Kidney Disease Neg Hx        Review of Systems   Constitutional:  Negative for fever.   Respiratory:  Negative for shortness of breath.    Cardiovascular:  Negative for chest pain.   Gastrointestinal:  Negative for abdominal pain.   Musculoskeletal:  Positive for joint pain (left knee pain).   All other systems reviewed and are negative.      /64 (BP Location: Right arm, Patient Position: Sitting, BP Cuff Size: Adult)   Pulse 84   Temp 36.7 °C (98.1 °F) (Temporal)   Ht 1.676 m (5' 6\")   Wt 57.1 kg (125 lb 12.8 oz)   SpO2 96%   BMI 20.30 kg/m²     Physical Exam  Constitutional:       General: He is not in acute distress.  HENT:      Mouth/Throat:      Pharynx: No oropharyngeal exudate.   Eyes:      General: No scleral icterus.  Neck:      Trachea: No tracheal deviation. " "  Cardiovascular:      Rate and Rhythm: Normal rate and regular rhythm.      Heart sounds: Normal heart sounds. No murmur heard.  Pulmonary:      Effort: Pulmonary effort is normal.      Breath sounds: Normal breath sounds. No stridor. No rales.   Abdominal:      General: Bowel sounds are normal.      Palpations: Abdomen is soft.      Tenderness: There is no abdominal tenderness.   Musculoskeletal:         General: Normal range of motion.      Cervical back: Neck supple.      Right lower leg: No edema.      Left lower leg: No edema.   Skin:     General: Skin is warm and dry.      Findings: No rash.   Neurological:      General: No focal deficit present.      Mental Status: He is alert and oriented to person, place, and time.   Psychiatric:         Mood and Affect: Mood and affect normal.         Behavior: Behavior normal.         Labs reviewed.    Recent Labs     02/06/25  1012   ALBUMIN 4.3   HDL 64   TRIGLYCERIDE 72   SODIUM 142   POTASSIUM 4.1   CHLORIDE 108   CO2 23   BUN 27*   CREATININE 1.49*       Lab Results   Component Value Date/Time    WBC 5.4 07/25/2020 01:36 PM    RBC 2.47 (L) 07/25/2020 01:36 PM    HEMOGLOBIN 7.6 (L) 07/25/2020 01:36 PM    HEMATOCRIT 23.6 (L) 07/25/2020 01:36 PM    MCV 95.5 07/25/2020 01:36 PM    MCH 30.8 07/25/2020 01:36 PM    MCHC 32.2 (L) 07/25/2020 01:36 PM    MPV 9.3 07/25/2020 01:36 PM           URINALYSIS:  Lab Results   Component Value Date/Time    COLORURINE Yellow 02/06/2025 1011    CLARITY Clear 02/06/2025 1011    SPECGRAVITY 1.019 02/06/2025 1011    PHURINE 5.5 02/06/2025 1011    KETONES Negative 02/06/2025 1011    PROTEINURIN Negative 02/06/2025 1011    BILIRUBINUR Negative 02/06/2025 1011    UROBILU 1.0 02/06/2025 1011    NITRITE Negative 02/06/2025 1011    LEUKESTERAS Trace (A) 02/06/2025 1011    OCCULTBLOOD Negative 02/06/2025 1011     UPC  No results found for: \"TOTPROTUR\" No results found for: \"CREATININEU\"      Imaging report(s) reviewed  No orders to display "         Assessment:  Parker Kern is a 79 y.o. male with a history of hypertension, BPH, gout, CKD 3a who presents today to establish nephrology care.     Plan:  1. Stage 3a chronic kidney disease  - Underlying CKD likely from history of hypertension, NSAID use, age-related kidney decline.  I recommend avoiding NSAIDs and other nephrotoxins.  I recommend a whole food, plant-based, low-salt diet.  I explained the importance of blood pressure control to help slow CKD progression. I recommend maintaining olmesartan for long-term kidney protection.      2. HTN (hypertension), benign  - Fairly well-controlled.  Maintain olmesartan and amlodipine for now.    3. Benign prostatic hyperplasia with urinary retention  -Symptoms controlled per patient.  Maintain tamsulosin 0.4 mg daily.    4. Gout, arthritis  -Patient does complain of left knee pain, but it seems more like osteoarthritis than gout arthritis.  If patient does complain of gout flares 2 or more times per year, I would recommend starting on allopurinol for uric acid suppression.        Return to clinic in 1 year with pre-clinic labs    Juan Ndiaye MD  Nephrology  [facility]

## 2025-02-25 NOTE — PATIENT INSTRUCTIONS
"Tylenol (generic name: acetaminophen) is SAFE for the kidneys. Maximum dose of tylenol is 3000mg a day. Aspirin also appears to be safe for the kidneys. Please avoid other NSAIDs (Non-Steroid Anti-Inflammatory Drugs), such as ibuprofen (brand names: Motrin, Advil), naproxen (brand name: Aleve), celecoxib (brand name: Celebrex), or even prescription NSAIDs such as meloxicam (brand name: Mobic), diclofenac (brand name: Voltaren), ketorolac (brand name: Toradol), or indomethacin (brand name: Indocin), as they can be bad for your kidneys.           \"Whole-food, plant-based diet\"    The American College of Lifestyle Medicine (ACLM) recommends a whole-food, plant-based diet for prevention and treatment of many diseases including cardiovascular disease, type 2 diabetes, obesity. Plant-based diets can help slow down, and potentially improve, chronic kidney disease.   https://lifestylemedicine.org/articles/benefits-plant-based-nutrition-chronic-kidney-disease/   "

## 2025-03-07 ENCOUNTER — APPOINTMENT (OUTPATIENT)
Dept: MEDICAL GROUP | Facility: CLINIC | Age: 80
End: 2025-03-07
Payer: COMMERCIAL

## 2025-04-11 ENCOUNTER — APPOINTMENT (OUTPATIENT)
Dept: MEDICAL GROUP | Facility: CLINIC | Age: 80
End: 2025-04-11
Payer: COMMERCIAL